# Patient Record
Sex: FEMALE | Race: WHITE | NOT HISPANIC OR LATINO | Employment: OTHER | URBAN - METROPOLITAN AREA
[De-identification: names, ages, dates, MRNs, and addresses within clinical notes are randomized per-mention and may not be internally consistent; named-entity substitution may affect disease eponyms.]

---

## 2017-01-04 ENCOUNTER — HOSPITAL ENCOUNTER (OUTPATIENT)
Dept: RADIOLOGY | Facility: MEDICAL CENTER | Age: 67
End: 2017-01-04
Attending: FAMILY MEDICINE
Payer: MEDICARE

## 2017-01-04 DIAGNOSIS — M79.631 PAIN OF RIGHT FOREARM: ICD-10-CM

## 2017-01-04 PROCEDURE — 73218 MRI UPPER EXTREMITY W/O DYE: CPT | Mod: RT

## 2017-02-02 ENCOUNTER — RX ONLY (OUTPATIENT)
Age: 67
Setting detail: RX ONLY
End: 2017-02-02

## 2017-02-07 ENCOUNTER — HOSPITAL ENCOUNTER (OUTPATIENT)
Dept: RADIOLOGY | Facility: MEDICAL CENTER | Age: 67
End: 2017-02-07
Attending: FAMILY MEDICINE
Payer: MEDICARE

## 2017-02-07 DIAGNOSIS — M85.80 OSTEOPENIA: ICD-10-CM

## 2017-02-07 DIAGNOSIS — Z12.31 ENCOUNTER FOR SCREENING MAMMOGRAM FOR BREAST CANCER: ICD-10-CM

## 2017-02-07 DIAGNOSIS — E28.39 DECREASED ESTROGEN LEVEL: ICD-10-CM

## 2017-02-07 PROCEDURE — 77063 BREAST TOMOSYNTHESIS BI: CPT

## 2017-02-07 PROCEDURE — 77080 DXA BONE DENSITY AXIAL: CPT

## 2017-02-24 ENCOUNTER — HOSPITAL ENCOUNTER (OUTPATIENT)
Dept: LAB | Facility: MEDICAL CENTER | Age: 67
End: 2017-02-24
Attending: NURSE PRACTITIONER
Payer: MEDICARE

## 2017-02-24 ENCOUNTER — OFFICE VISIT (OUTPATIENT)
Dept: URGENT CARE | Facility: CLINIC | Age: 67
End: 2017-02-24
Payer: MEDICARE

## 2017-02-24 VITALS
BODY MASS INDEX: 19.53 KG/M2 | TEMPERATURE: 98.5 F | HEART RATE: 98 BPM | WEIGHT: 100 LBS | RESPIRATION RATE: 16 BRPM | DIASTOLIC BLOOD PRESSURE: 76 MMHG | OXYGEN SATURATION: 97 % | SYSTOLIC BLOOD PRESSURE: 110 MMHG

## 2017-02-24 DIAGNOSIS — R10.84 GENERALIZED ABDOMINAL CRAMPING: ICD-10-CM

## 2017-02-24 DIAGNOSIS — K57.52 DIVERTICULITIS OF BOTH SMALL AND LARGE INTESTINE WITHOUT PERFORATION OR ABSCESS WITHOUT BLEEDING: ICD-10-CM

## 2017-02-24 LAB
ALBUMIN SERPL BCP-MCNC: 3.2 G/DL (ref 3.2–4.9)
ALBUMIN/GLOB SERPL: 1 G/DL
ALP SERPL-CCNC: 108 U/L (ref 30–99)
ALT SERPL-CCNC: 42 U/L (ref 2–50)
ANION GAP SERPL CALC-SCNC: 9 MMOL/L (ref 0–11.9)
AST SERPL-CCNC: 51 U/L (ref 12–45)
BASOPHILS # BLD AUTO: 0.06 K/UL (ref 0–0.12)
BASOPHILS NFR BLD AUTO: 0.8 % (ref 0–1.8)
BILIRUB SERPL-MCNC: 0.3 MG/DL (ref 0.1–1.5)
BUN SERPL-MCNC: 12 MG/DL (ref 8–22)
CALCIUM SERPL-MCNC: 6 MG/DL (ref 8.5–10.5)
CHLORIDE SERPL-SCNC: 106 MMOL/L (ref 96–112)
CO2 SERPL-SCNC: 22 MMOL/L (ref 20–33)
CREAT SERPL-MCNC: 0.83 MG/DL (ref 0.5–1.4)
CRP SERPL HS-MCNC: 0.2 MG/DL (ref 0–0.75)
EOSINOPHIL # BLD: 0.06 K/UL (ref 0–0.51)
EOSINOPHIL NFR BLD AUTO: 0.8 % (ref 0–6.9)
ERYTHROCYTE [DISTWIDTH] IN BLOOD BY AUTOMATED COUNT: 45.9 FL (ref 35.9–50)
GLOBULIN SER CALC-MCNC: 3.2 G/DL (ref 1.9–3.5)
GLUCOSE SERPL-MCNC: 90 MG/DL (ref 65–99)
HCT VFR BLD AUTO: 35.2 % (ref 37–47)
HGB BLD-MCNC: 11 G/DL (ref 12–16)
IMM GRANULOCYTES # BLD AUTO: 0.02 K/UL (ref 0–0.11)
IMM GRANULOCYTES NFR BLD AUTO: 0.3 % (ref 0–0.9)
LYMPHOCYTES # BLD: 1.97 K/UL (ref 1–4.8)
LYMPHOCYTES NFR BLD AUTO: 25.5 % (ref 22–41)
MCH RBC QN AUTO: 30.5 PG (ref 27–33)
MCHC RBC AUTO-ENTMCNC: 31.3 G/DL (ref 33.6–35)
MCV RBC AUTO: 97.5 FL (ref 81.4–97.8)
MONOCYTES # BLD: 0.81 K/UL (ref 0–0.85)
MONOCYTES NFR BLD AUTO: 10.5 % (ref 0–13.4)
NEUTROPHILS # BLD: 4.81 K/UL (ref 2–7.15)
NEUTROPHILS NFR BLD AUTO: 62.1 % (ref 44–72)
NRBC # BLD AUTO: 0 K/UL
NRBC BLD-RTO: 0 /100 WBC
PLATELET # BLD AUTO: 372 K/UL (ref 164–446)
PMV BLD AUTO: 10.9 FL (ref 9–12.9)
POTASSIUM SERPL-SCNC: 4.4 MMOL/L (ref 3.6–5.5)
PROT SERPL-MCNC: 6.4 G/DL (ref 6–8.2)
RBC # BLD AUTO: 3.61 M/UL (ref 4.2–5.4)
SODIUM SERPL-SCNC: 137 MMOL/L (ref 135–145)
WBC # BLD AUTO: 7.7 K/UL (ref 4.8–10.8)

## 2017-02-24 PROCEDURE — 1101F PT FALLS ASSESS-DOCD LE1/YR: CPT | Performed by: NURSE PRACTITIONER

## 2017-02-24 PROCEDURE — 3014F SCREEN MAMMO DOC REV: CPT | Performed by: NURSE PRACTITIONER

## 2017-02-24 PROCEDURE — G8432 DEP SCR NOT DOC, RNG: HCPCS | Performed by: NURSE PRACTITIONER

## 2017-02-24 PROCEDURE — 86140 C-REACTIVE PROTEIN: CPT

## 2017-02-24 PROCEDURE — 85025 COMPLETE CBC W/AUTO DIFF WBC: CPT

## 2017-02-24 PROCEDURE — 36415 COLL VENOUS BLD VENIPUNCTURE: CPT

## 2017-02-24 PROCEDURE — G8482 FLU IMMUNIZE ORDER/ADMIN: HCPCS | Performed by: NURSE PRACTITIONER

## 2017-02-24 PROCEDURE — 80053 COMPREHEN METABOLIC PANEL: CPT

## 2017-02-24 PROCEDURE — 99204 OFFICE O/P NEW MOD 45 MIN: CPT | Performed by: NURSE PRACTITIONER

## 2017-02-24 PROCEDURE — G8419 CALC BMI OUT NRM PARAM NOF/U: HCPCS | Performed by: NURSE PRACTITIONER

## 2017-02-24 PROCEDURE — 3017F COLORECTAL CA SCREEN DOC REV: CPT | Mod: 8P | Performed by: NURSE PRACTITIONER

## 2017-02-24 PROCEDURE — 1036F TOBACCO NON-USER: CPT | Performed by: NURSE PRACTITIONER

## 2017-02-24 PROCEDURE — 4040F PNEUMOC VAC/ADMIN/RCVD: CPT | Mod: 8P | Performed by: NURSE PRACTITIONER

## 2017-02-24 RX ORDER — METRONIDAZOLE 500 MG/1
500 TABLET ORAL 3 TIMES DAILY
Qty: 21 TAB | Refills: 0 | Status: SHIPPED | OUTPATIENT
Start: 2017-02-24 | End: 2017-03-03

## 2017-02-24 RX ORDER — CIPROFLOXACIN 500 MG/1
500 TABLET, FILM COATED ORAL 2 TIMES DAILY
Qty: 14 TAB | Refills: 0 | Status: SHIPPED | OUTPATIENT
Start: 2017-02-24 | End: 2017-03-03

## 2017-02-24 RX ORDER — DICYCLOMINE HCL 20 MG
20 TABLET ORAL EVERY 6 HOURS
Qty: 90 TAB | Refills: 0 | Status: ON HOLD | OUTPATIENT
Start: 2017-02-24 | End: 2017-07-19

## 2017-02-24 ASSESSMENT — ENCOUNTER SYMPTOMS
NAUSEA: 0
FLATUS: 1
CHILLS: 0
ABDOMINAL PAIN: 1
SWEATS: 0
FEVER: 0
BLOOD IN STOOL: 0
DIARRHEA: 1
BLOATING: 1
VOMITING: 0

## 2017-02-24 NOTE — MR AVS SNAPSHOT
Mamie Michaels   2017 1:15 PM   Office Visit   MRN: [id number]    Department:  [facility]   Dept Phone:  [phone]    Description:  Female : 1950   Provider:  LEXII Israel           Reason for Visit     Diarrhea x 20 years the last couple days its getting worse, crampning      Allergies as of 2017     No Known Allergies      You were diagnosed with     Diverticulitis of both small and large intestine without perforation or abscess without bleeding   [395056]       Generalized abdominal cramping   [216188]         Vital Signs     Blood Pressure Pulse Temperature Respirations Weight Oxygen Saturation    110/76 mmHg 98 36.9 °C (98.5 °F) 16 45.36 kg (100 lb) 97%    Smoking Status                   Never Smoker            Basic Information     Date Of Birth Sex Race Ethnicity Preferred Language    1950 Female White Non- English      Problem List              ICD-10-CM Priority Class Noted - Resolved    Anemia D64.9   2016 - Present    History of lower GI bleeding Z87.19   2016 - Present    Hypothyroidism due to Hashimoto's thyroiditis E03.8, E06.3   2016 - Present    Osteopenia M85.80   2016 - Present    Right wrist pain M25.531   2016 - Present    Rash R21   2016 - Present    History of skin cancer of unknown type Z80.8   2016 - Present    Vitamin D deficiency E55.9   Unknown - Present    Primary insomnia F51.01   2016 - Present    Hypocalcemia E83.51   2016 - Present    Chronic kidney disease, stage III (moderate) N18.3   2016 - Present      Health Maintenance        Date Due Completion Dates    PAP SMEAR 1972 ---    COLONOSCOPY 2001 ---    IMM PNEUMOCOCCAL 65+ (ADULT) LOW/MEDIUM RISK SERIES (1 of 2 - PCV13) 2016 ---    MAMMOGRAM 2018, 2010, 2008, 2008, 3/14/2007, 3/14/2007    BONE DENSITY 2022, 2010, 2008    IMM DTaP/Tdap/Td Vaccine  (2 - Td) 10/19/2026 10/19/2016            Current Immunizations     Influenza Vaccine Quad Inj (Preserved) 10/19/2016    SHINGLES VACCINE 1/22/2013    Tdap Vaccine 10/19/2016      Below and/or attached are the medications your provider expects you to take. Review all of your home medications and newly ordered medications with your provider and/or pharmacist. Follow medication instructions as directed by your provider and/or pharmacist. Please keep your medication list with you and share with your provider. Update the information when medications are discontinued, doses are changed, or new medications (including over-the-counter products) are added; and carry medication information at all times in the event of emergency situations     Allergies:  No Known Allergies          Medications  Valid as of: February 24, 2017 -  2:24 PM    Generic Name Brand Name Tablet Size Instructions for use    Calcium Citrate-Vitamin D   Take  by mouth.        Ciprofloxacin HCl (Tab) CIPRO 500 MG Take 1 Tab by mouth 2 times a day for 7 days.        Diclofenac Sodium (Gel) Diclofenac Sodium 1 % Apply to small amount to affected area BID as needed for pain        Dicyclomine HCl (Tab) BENTYL 20 MG Take 1 Tab by mouth every 6 hours.        Ibandronate Sodium (Tab) BONIVA 150 MG Take 150 mg by mouth every 30 days.        Levothyroxine Sodium (Tab) SYNTHROID 75 MCG Take 1 Tab by mouth Every morning on an empty stomach.        MetroNIDAZOLE (Tab) FLAGYL 500 MG Take 1 Tab by mouth 3 times a day for 7 days.        Multiple Vitamins-Minerals (Tab) THERAGRAN-M  Take 1 Tab by mouth every day.        Omeprazole (CAPSULE DELAYED RELEASE) PRILOSEC 20 MG Take 20 mg by mouth every day.        Psyllium   Take  by mouth.        Zolpidem Tartrate (Tab) AMBIEN 10 MG Take 1 Tab by mouth at bedtime as needed for Sleep.        .                 Medicines prescribed today were sent to:     [facility] - [place], [place] - [address]    [address] [place] [address]    Phone: [phone] Fax: [phone]    Open 24 Hours?: No      Medication refill instructions:       If your prescription bottle indicates you have medication refills left, it is not necessary to call your provider’s office. Please contact your pharmacy and they will refill your medication.    If your prescription bottle indicates you do not have any refills left, you may request refills at any time through one of the following ways: The online Panorama9 system (except Urgent Care), by calling your provider’s office, or by asking your pharmacy to contact your provider’s office with a refill request. Medication refills are processed only during regular business hours and may not be available until the next business day. Your provider may request additional information or to have a follow-up visit with you prior to refilling your medication.   *Please Note: Medication refills are assigned a new Rx number when refilled electronically. Your pharmacy may indicate that no refills were authorized even though a new prescription for the same medication is available at the pharmacy. Please request the medicine by name with the pharmacy before contacting your provider for a refill.        Your To Do List     Future Labs/Procedures Complete By Expires    CBC WITH DIFFERENTIAL  As directed 2/24/2018    COMP METABOLIC PANEL  As directed 2/24/2018    CRP QUANTITIVE (NON-CARDIAC)  As directed 2/24/2018         [email or web address] Access Code: Activation code not generated  Current Panorama9 Status: Active
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
## 2017-02-25 ENCOUNTER — TELEPHONE (OUTPATIENT)
Dept: URGENT CARE | Facility: CLINIC | Age: 67
End: 2017-02-25

## 2017-02-25 ENCOUNTER — HOSPITAL ENCOUNTER (OUTPATIENT)
Facility: MEDICAL CENTER | Age: 67
End: 2017-02-25
Attending: NURSE PRACTITIONER
Payer: MEDICARE

## 2017-02-25 PROCEDURE — 87045 FECES CULTURE AEROBIC BACT: CPT

## 2017-02-25 PROCEDURE — 87046 STOOL CULTR AEROBIC BACT EA: CPT

## 2017-02-25 NOTE — TELEPHONE ENCOUNTER
Called patient and advised her of critically low calcium levels. She understands and states she feels better today but is aware that if she begins to have muscle stiffness or heart palpitations to go immediately to the ER. She will continue with her daily calcium supplements and electrolyte water intake.

## 2017-02-28 LAB
BACTERIA STL CULT: NORMAL
SIGNIFICANT IND 70042: NORMAL
SOURCE SOURCE: NORMAL

## 2017-03-27 ENCOUNTER — OFFICE VISIT (OUTPATIENT)
Dept: MEDICAL GROUP | Facility: MEDICAL CENTER | Age: 67
End: 2017-03-27
Payer: MEDICARE

## 2017-03-27 VITALS
RESPIRATION RATE: 16 BRPM | SYSTOLIC BLOOD PRESSURE: 140 MMHG | OXYGEN SATURATION: 96 % | HEART RATE: 99 BPM | WEIGHT: 101 LBS | HEIGHT: 60 IN | DIASTOLIC BLOOD PRESSURE: 80 MMHG | TEMPERATURE: 97.8 F | BODY MASS INDEX: 19.83 KG/M2

## 2017-03-27 DIAGNOSIS — K64.9 HEMORRHOIDS, UNSPECIFIED HEMORRHOID TYPE: ICD-10-CM

## 2017-03-27 DIAGNOSIS — F51.01 PRIMARY INSOMNIA: ICD-10-CM

## 2017-03-27 PROCEDURE — 3014F SCREEN MAMMO DOC REV: CPT | Performed by: NURSE PRACTITIONER

## 2017-03-27 PROCEDURE — 1101F PT FALLS ASSESS-DOCD LE1/YR: CPT | Performed by: NURSE PRACTITIONER

## 2017-03-27 PROCEDURE — 3017F COLORECTAL CA SCREEN DOC REV: CPT | Mod: 8P | Performed by: NURSE PRACTITIONER

## 2017-03-27 PROCEDURE — 4040F PNEUMOC VAC/ADMIN/RCVD: CPT | Mod: 8P | Performed by: NURSE PRACTITIONER

## 2017-03-27 PROCEDURE — 99214 OFFICE O/P EST MOD 30 MIN: CPT | Performed by: NURSE PRACTITIONER

## 2017-03-27 PROCEDURE — G8420 CALC BMI NORM PARAMETERS: HCPCS | Performed by: NURSE PRACTITIONER

## 2017-03-27 PROCEDURE — 1036F TOBACCO NON-USER: CPT | Performed by: NURSE PRACTITIONER

## 2017-03-27 PROCEDURE — G8432 DEP SCR NOT DOC, RNG: HCPCS | Performed by: NURSE PRACTITIONER

## 2017-03-27 PROCEDURE — G8482 FLU IMMUNIZE ORDER/ADMIN: HCPCS | Performed by: NURSE PRACTITIONER

## 2017-03-27 RX ORDER — AMITRIPTYLINE HYDROCHLORIDE 25 MG/1
25-50 TABLET, FILM COATED ORAL NIGHTLY PRN
Qty: 60 TAB | Refills: 0 | Status: ON HOLD | OUTPATIENT
Start: 2017-03-27 | End: 2017-07-19

## 2017-03-27 RX ORDER — ZOLPIDEM TARTRATE 10 MG/1
10 TABLET ORAL NIGHTLY PRN
Qty: 30 TAB | Refills: 2 | Status: SHIPPED | OUTPATIENT
Start: 2017-03-27 | End: 2017-07-12 | Stop reason: SDUPTHER

## 2017-03-27 NOTE — MR AVS SNAPSHOT
Mamie Michaels   3/27/2017 9:20 AM   Office Visit   MRN: [id number]    Department:  [facility]   Dept Phone:  [phone]    Description:  Female : 1950   Provider:  LEXII Corrales           Reason for Visit     Medication Refill           Allergies as of 3/27/2017     No Known Allergies      You were diagnosed with     Primary insomnia   [192410]       Hemorrhoids, unspecified hemorrhoid type   [[id number]]         Vital Signs     Blood Pressure Pulse Temperature Respirations Height Weight    140/80 mmHg 99 36.6 °C (97.8 °F) 16 1.524 m (5') 45.813 kg (101 lb)    Body Mass Index Oxygen Saturation Smoking Status             19.73 kg/m2 96% Never Smoker          Basic Information     Date Of Birth Sex Race Ethnicity Preferred Language    1950 Female White Non- English      Problem List              ICD-10-CM Priority Class Noted - Resolved    Anemia D64.9   2016 - Present    History of lower GI bleeding Z87.19   2016 - Present    Hypothyroidism due to Hashimoto's thyroiditis E03.8, E06.3   2016 - Present    Osteopenia M85.80   2016 - Present    Right wrist pain M25.531   2016 - Present    Rash R21   2016 - Present    History of skin cancer of unknown type Z80.8   2016 - Present    Vitamin D deficiency E55.9   Unknown - Present    Primary insomnia F51.01   2016 - Present    Hypocalcemia E83.51   2016 - Present    Chronic kidney disease, stage III (moderate) N18.3   2016 - Present      Health Maintenance        Date Due Completion Dates    PAP SMEAR 1972 ---    COLONOSCOPY 2001 ---    IMM PNEUMOCOCCAL 65+ (ADULT) LOW/MEDIUM RISK SERIES (1 of 2 - PCV13) 2016 ---    MAMMOGRAM 2018, 2010, 2008, 2008, 3/14/2007, 3/14/2007    BONE DENSITY 2022, 2010, 2008    IMM DTaP/Tdap/Td Vaccine (2 - Td) 10/19/2026 10/19/2016            Current Immunizations     Influenza  Vaccine Quad Inj (Preserved) 10/19/2016    SHINGLES VACCINE 1/22/2013    Tdap Vaccine 10/19/2016      Below and/or attached are the medications your provider expects you to take. Review all of your home medications and newly ordered medications with your provider and/or pharmacist. Follow medication instructions as directed by your provider and/or pharmacist. Please keep your medication list with you and share with your provider. Update the information when medications are discontinued, doses are changed, or new medications (including over-the-counter products) are added; and carry medication information at all times in the event of emergency situations     Allergies:  No Known Allergies          Medications  Valid as of: March 27, 2017 - 10:40 AM    Generic Name Brand Name Tablet Size Instructions for use    Amitriptyline HCl (Tab) ELAVIL 25 MG Take 1-2 Tabs by mouth at bedtime as needed.        Calcium Citrate-Vitamin D   Take  by mouth.        Diclofenac Sodium (Gel) Diclofenac Sodium 1 % Apply to small amount to affected area BID as needed for pain        Dicyclomine HCl (Tab) BENTYL 20 MG Take 1 Tab by mouth every 6 hours.        Ibandronate Sodium (Tab) BONIVA 150 MG Take 150 mg by mouth every 30 days.        Levothyroxine Sodium (Tab) SYNTHROID 75 MCG Take 1 Tab by mouth Every morning on an empty stomach.        Multiple Vitamins-Minerals (Tab) THERAGRAN-M  Take 1 Tab by mouth every day.        Omeprazole (CAPSULE DELAYED RELEASE) PRILOSEC 20 MG Take 20 mg by mouth every day.        Pramoxine HCl (Foam) PROCTOFOAM 1 % Apply MD QID PRN        Psyllium   Take  by mouth.        Zolpidem Tartrate (Tab) AMBIEN 10 MG Take 1 Tab by mouth at bedtime as needed for Sleep.        .                 Medicines prescribed today were sent to:     [facility] [place] [place] - [address]    [address] [facility] [place] [address]    Phone: [phone] Fax: [phone]    Open 24 Hours?: No      Medication  refill instructions:       If your prescription bottle indicates you have medication refills left, it is not necessary to call your provider’s office. Please contact your pharmacy and they will refill your medication.    If your prescription bottle indicates you do not have any refills left, you may request refills at any time through one of the following ways: The online Adictiz system (except Urgent Care), by calling your provider’s office, or by asking your pharmacy to contact your provider’s office with a refill request. Medication refills are processed only during regular business hours and may not be available until the next business day. Your provider may request additional information or to have a follow-up visit with you prior to refilling your medication.   *Please Note: Medication refills are assigned a new Rx number when refilled electronically. Your pharmacy may indicate that no refills were authorized even though a new prescription for the same medication is available at the pharmacy. Please request the medicine by name with the pharmacy before contacting your provider for a refill.           Adictiz Access Code: Activation code not generated  Current Adictiz Status: Active
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
## 2017-03-27 NOTE — ASSESSMENT & PLAN NOTE
Continues with ambien 10 mg daily, not always effective  Caffeine 1 up in the morning  ETOH: very little  No tobacco  Watching TV in bed  No regular exercise although she plays golf on occ.  Does not recall trying any other medication but is agreeable to trying alternative  Denies falls, wandering or eating at night

## 2017-06-26 ENCOUNTER — RX ONLY (OUTPATIENT)
Age: 67
Setting detail: RX ONLY
End: 2017-06-26

## 2017-07-05 ENCOUNTER — APPOINTMENT (OUTPATIENT)
Dept: OTHER | Facility: IMAGING CENTER | Age: 67
End: 2017-07-05

## 2017-07-05 PROCEDURE — 97530 THERAPEUTIC ACTIVITIES: CPT | Performed by: FAMILY MEDICINE

## 2017-07-12 ENCOUNTER — HOSPITAL ENCOUNTER (OUTPATIENT)
Dept: LAB | Facility: MEDICAL CENTER | Age: 67
End: 2017-07-12
Attending: FAMILY MEDICINE
Payer: MEDICARE

## 2017-07-12 ENCOUNTER — TELEPHONE (OUTPATIENT)
Dept: MEDICAL GROUP | Age: 67
End: 2017-07-12

## 2017-07-12 ENCOUNTER — OFFICE VISIT (OUTPATIENT)
Dept: MEDICAL GROUP | Facility: MEDICAL CENTER | Age: 67
End: 2017-07-12
Payer: MEDICARE

## 2017-07-12 VITALS
DIASTOLIC BLOOD PRESSURE: 78 MMHG | WEIGHT: 104 LBS | HEART RATE: 90 BPM | TEMPERATURE: 98 F | OXYGEN SATURATION: 97 % | SYSTOLIC BLOOD PRESSURE: 122 MMHG | HEIGHT: 60 IN | BODY MASS INDEX: 20.42 KG/M2

## 2017-07-12 DIAGNOSIS — N18.30 CHRONIC KIDNEY DISEASE, STAGE III (MODERATE) (HCC): ICD-10-CM

## 2017-07-12 DIAGNOSIS — E83.51 HYPOCALCEMIA: ICD-10-CM

## 2017-07-12 DIAGNOSIS — F51.01 PRIMARY INSOMNIA: ICD-10-CM

## 2017-07-12 DIAGNOSIS — H90.0 CONDUCTIVE HEARING LOSS, BILATERAL: ICD-10-CM

## 2017-07-12 DIAGNOSIS — L03.115 CELLULITIS OF RIGHT LOWER EXTREMITY: ICD-10-CM

## 2017-07-12 LAB
ANION GAP SERPL CALC-SCNC: 7 MMOL/L (ref 0–11.9)
BUN SERPL-MCNC: 13 MG/DL (ref 8–22)
CALCIUM SERPL-MCNC: 6.4 MG/DL (ref 8.5–10.5)
CHLORIDE SERPL-SCNC: 110 MMOL/L (ref 96–112)
CO2 SERPL-SCNC: 23 MMOL/L (ref 20–33)
CREAT SERPL-MCNC: 0.72 MG/DL (ref 0.5–1.4)
GFR SERPL CREATININE-BSD FRML MDRD: >60 ML/MIN/1.73 M 2
GLUCOSE SERPL-MCNC: 82 MG/DL (ref 65–99)
PHOSPHATE SERPL-MCNC: 3.5 MG/DL (ref 2.5–4.5)
POTASSIUM SERPL-SCNC: 4.3 MMOL/L (ref 3.6–5.5)
PTH-INTACT SERPL-MCNC: 21.1 PG/ML (ref 14–72)
SODIUM SERPL-SCNC: 140 MMOL/L (ref 135–145)

## 2017-07-12 PROCEDURE — 99214 OFFICE O/P EST MOD 30 MIN: CPT | Performed by: FAMILY MEDICINE

## 2017-07-12 PROCEDURE — 83970 ASSAY OF PARATHORMONE: CPT

## 2017-07-12 PROCEDURE — 84100 ASSAY OF PHOSPHORUS: CPT

## 2017-07-12 PROCEDURE — 36415 COLL VENOUS BLD VENIPUNCTURE: CPT

## 2017-07-12 PROCEDURE — 80048 BASIC METABOLIC PNL TOTAL CA: CPT

## 2017-07-12 RX ORDER — COLESTIPOL HYDROCHLORIDE 5 G/5G
GRANULE, FOR SUSPENSION ORAL
Status: ON HOLD | COMMUNITY
Start: 2017-05-16 | End: 2017-07-19

## 2017-07-12 RX ORDER — IMIQUIMOD 12.5 MG/.25G
CREAM TOPICAL
Status: ON HOLD | COMMUNITY
Start: 2017-06-27 | End: 2017-07-19

## 2017-07-12 RX ORDER — ZOLPIDEM TARTRATE 10 MG/1
10 TABLET ORAL NIGHTLY PRN
Qty: 30 TAB | Refills: 2 | Status: SHIPPED | OUTPATIENT
Start: 2017-07-12

## 2017-07-12 NOTE — ASSESSMENT & PLAN NOTE
Patient reports that she scraped the back of her leg about 2 weeks ago and since then she's had increased redness and tenderness as well as some swelling. She denies any drainage.

## 2017-07-12 NOTE — MR AVS SNAPSHOT
Mamie Michaels   2017 7:20 AM   Office Visit   MRN: [id number]    Department:  [facility]   Dept Phone:  [phone]    Description:  Female : 1950   Provider:  Sol Danielle M.D.           Reason for Visit     Ear Fullness bilateral    Leg Swelling right leg    Medication Refill Ambien      Allergies as of 2017     No Known Allergies      You were diagnosed with     Conductive hearing loss, bilateral   [389.06.ICD-9-CM]       Primary insomnia   [994146]       Cellulitis of right lower extremity   [831968]       Hypocalcemia   [275.41.ICD-9-CM]       Chronic kidney disease, stage III (moderate)   [585.3.ICD-9-CM]         Vital Signs     Blood Pressure Pulse Temperature Height Weight Body Mass Index    122/78 mmHg 90 36.7 °C (98 °F) 1.524 m (5') 47.174 kg (104 lb) 20.31 kg/m2    Oxygen Saturation Smoking Status                97% Never Smoker           Basic Information     Date Of Birth Sex Race Ethnicity Preferred Language    1950 Female White Non- English      Your appointments     Aug 14, 2017  1:30 PM   ACU GROUP with Dimas Horton M.D.   [facility] (--)    [address] [facility] [id number] [phone]              Problem List              ICD-10-CM Priority Class Noted - Resolved    Anemia D64.9   2016 - Present    History of lower GI bleeding Z87.19   2016 - Present    Hypothyroidism due to Hashimoto's thyroiditis E03.8, E06.3   2016 - Present    Osteopenia M85.80   2016 - Present    Right wrist pain M25.531   2016 - Present    Rash R21   2016 - Present    History of skin cancer of unknown type Z80.8   2016 - Present    Vitamin D deficiency E55.9   Unknown - Present    Primary insomnia F51.01   2016 - Present    Hypocalcemia E83.51   2016 - Present    Chronic kidney disease, stage III (moderate) N18.3   2016 - Present    Conductive hearing loss,  bilateral H90.0   7/12/2017 - Present      Health Maintenance        Date Due Completion Dates    PAP SMEAR 12/25/1972 ---    COLONOSCOPY 12/25/2001 ---    IMM PNEUMOCOCCAL 65+ (ADULT) LOW/MEDIUM RISK SERIES (1 of 2 - PCV13) 12/25/2016 ---    IMM INFLUENZA (1) 9/1/2017 10/19/2016    MAMMOGRAM 2/7/2018 2/7/2017, 12/7/2010, 6/9/2008, 6/9/2008, 3/14/2007, 3/14/2007    BONE DENSITY 2/7/2022 2/7/2017, 12/7/2010, 6/9/2008    IMM DTaP/Tdap/Td Vaccine (2 - Td) 10/19/2026 10/19/2016            Current Immunizations     Influenza Vaccine Quad Inj (Preserved) 10/19/2016    SHINGLES VACCINE 1/22/2013    Tdap Vaccine 10/19/2016      Below and/or attached are the medications your provider expects you to take. Review all of your home medications and newly ordered medications with your provider and/or pharmacist. Follow medication instructions as directed by your provider and/or pharmacist. Please keep your medication list with you and share with your provider. Update the information when medications are discontinued, doses are changed, or new medications (including over-the-counter products) are added; and carry medication information at all times in the event of emergency situations     Allergies:  No Known Allergies          Medications  Valid as of: July 12, 2017 -  7:49 AM    Generic Name Brand Name Tablet Size Instructions for use    Amitriptyline HCl (Tab) ELAVIL 25 MG Take 1-2 Tabs by mouth at bedtime as needed.        Calcium Citrate-Vitamin D   Take  by mouth.        Colestipol HCl (Pack) COLESTID 5 GM         Diclofenac Sodium (Gel) Diclofenac Sodium 1 % Apply to small amount to affected area BID as needed for pain        Dicyclomine HCl (Tab) BENTYL 20 MG Take 1 Tab by mouth every 6 hours.        Ibandronate Sodium (Tab) BONIVA 150 MG Take 150 mg by mouth every 30 days.        Imiquimod (Cream) ALDARA 5 %         Levothyroxine Sodium (Tab) SYNTHROID 75 MCG Take 1 Tab by mouth Every morning on an empty stomach.         Multiple Vitamins-Minerals (Tab) THERAGRAN-M  Take 1 Tab by mouth every day.        Mupirocin (Ointment) BACTROBAN 2 % Apply thin film to affected area q 8 hours for 5 days        Omeprazole (CAPSULE DELAYED RELEASE) PRILOSEC 20 MG Take 20 mg by mouth every day.        Pramoxine HCl (Foam) PROCTOFOAM 1 % Apply FL QID PRN        Psyllium   Take  by mouth.        Zolpidem Tartrate (Tab) AMBIEN 10 MG Take 1 Tab by mouth at bedtime as needed for Sleep.        .                 Medicines prescribed today were sent to:     [facility] [address] [place] [address], [place] - [address]    [address] [place] [address]    Phone: [phone] Fax: [phone]    Open 24 Hours?: No      Medication refill instructions:       If your prescription bottle indicates you have medication refills left, it is not necessary to call your provider’s office. Please contact your pharmacy and they will refill your medication.    If your prescription bottle indicates you do not have any refills left, you may request refills at any time through one of the following ways: The online [employer] system (except Urgent Care), by calling your provider’s office, or by asking your pharmacy to contact your provider’s office with a refill request. Medication refills are processed only during regular business hours and may not be available until the next business day. Your provider may request additional information or to have a follow-up visit with you prior to refilling your medication.   *Please Note: Medication refills are assigned a new Rx number when refilled electronically. Your pharmacy may indicate that no refills were authorized even though a new prescription for the same medication is available at the pharmacy. Please request the medicine by name with the pharmacy before contacting your provider for a refill.        Your To Do List     Future Labs/Procedures Complete By Expires    BASIC METABOLIC PANEL  As directed 7/13/2018    CALCIUM (CA)   As directed 7/12/2018    PHOSPHORUS  As directed 7/12/2018    PTH INTACT (PTH ONLY)  As directed 7/12/2018      Referral     A referral request has been sent to our patient care coordination department. Please allow 3-5 business days for us to process this request and contact you either by phone or mail. If you do not hear from us by the 5th business day, please call us at [phone].           [employer] Access Code: Activation code not generated  Current [employer] Status: Active

## 2017-07-12 NOTE — ASSESSMENT & PLAN NOTE
The patient complains of increasing decreased hearing. She reports that she is often missing parts of conversations. She would like to have her ears checked to make sure she does not have wax in there and would like a referral to audiology. She denies any pain.

## 2017-07-12 NOTE — ASSESSMENT & PLAN NOTE
Patient had a very low calcium level after a bout of diarrhea. She was seen in the emergency room at which time her calcium was 6.0. She does have a history of chronic renal disease stage III that has been stable. She denies any palpitations or severe muscle cramps. She has not had her labs rechecked since that time.

## 2017-07-12 NOTE — ASSESSMENT & PLAN NOTE
This is chronic. Patient has difficulty with sleeping. Primarily secondary to mind racing. Difficulty falling asleep and staying asleep. Patient uses electronics before bed. Drinks beverages with caffeine into the afternoon. Not exercising first thing in the morning.  Currently taking Ambien 10 mg and takes it every night or she cannot sleep. She denies any amnestic events or falls.
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
## 2017-07-13 NOTE — TELEPHONE ENCOUNTER
On call note:  Received call from lab regarding critical lab result of low calcium 6.4 mg/dl. No albumin available.  Reviewed pt chart which shows prior labs with slightly lower calcium of 6.0 mg/dl in 2/2017 and 7.9 mg/dl in 12/2017.   Thus, appears hypocalcemia may be a chronic issue.  Tried to call pt to notify of result and inquire if having any significant symptoms which may be related at this time.   No answer on both numbers, message left to have me paged to discuss her results.    CC: Sol Danielle M.D.

## 2017-07-14 DIAGNOSIS — E83.51 HYPOCALCEMIA: ICD-10-CM

## 2017-07-18 ENCOUNTER — HOSPITAL ENCOUNTER (INPATIENT)
Facility: MEDICAL CENTER | Age: 67
LOS: 1 days | DRG: 641 | End: 2017-07-19
Attending: EMERGENCY MEDICINE | Admitting: INTERNAL MEDICINE
Payer: MEDICARE

## 2017-07-18 ENCOUNTER — RESOLUTE PROFESSIONAL BILLING HOSPITAL PROF FEE (OUTPATIENT)
Dept: HOSPITALIST | Facility: MEDICAL CENTER | Age: 67
End: 2017-07-18
Payer: MEDICARE

## 2017-07-18 ENCOUNTER — HOSPITAL ENCOUNTER (OUTPATIENT)
Dept: LAB | Facility: MEDICAL CENTER | Age: 67
End: 2017-07-18
Attending: FAMILY MEDICINE
Payer: MEDICARE

## 2017-07-18 ENCOUNTER — TELEPHONE (OUTPATIENT)
Dept: MEDICAL GROUP | Age: 67
End: 2017-07-18

## 2017-07-18 DIAGNOSIS — E83.42 HYPOMAGNESEMIA: ICD-10-CM

## 2017-07-18 DIAGNOSIS — E83.51 HYPOCALCEMIA: ICD-10-CM

## 2017-07-18 LAB
25(OH)D3 SERPL-MCNC: 34 NG/ML (ref 30–100)
HIV 1+2 AB+HIV1 P24 AG SERPL QL IA: NON REACTIVE
MAGNESIUM SERPL-MCNC: <0.5 MG/DL (ref 1.5–2.5)

## 2017-07-18 PROCEDURE — 87389 HIV-1 AG W/HIV-1&-2 AB AG IA: CPT

## 2017-07-18 PROCEDURE — 86038 ANTINUCLEAR ANTIBODIES: CPT

## 2017-07-18 PROCEDURE — 86225 DNA ANTIBODY NATIVE: CPT

## 2017-07-18 PROCEDURE — 82306 VITAMIN D 25 HYDROXY: CPT

## 2017-07-18 PROCEDURE — 83735 ASSAY OF MAGNESIUM: CPT | Mod: 91

## 2017-07-18 PROCEDURE — 99285 EMERGENCY DEPT VISIT HI MDM: CPT

## 2017-07-18 PROCEDURE — 86235 NUCLEAR ANTIGEN ANTIBODY: CPT | Mod: 91

## 2017-07-18 PROCEDURE — 80053 COMPREHEN METABOLIC PANEL: CPT

## 2017-07-18 PROCEDURE — 36415 COLL VENOUS BLD VENIPUNCTURE: CPT

## 2017-07-18 PROCEDURE — 83735 ASSAY OF MAGNESIUM: CPT

## 2017-07-18 PROCEDURE — 700111 HCHG RX REV CODE 636 W/ 250 OVERRIDE (IP)

## 2017-07-18 RX ORDER — MAGNESIUM SULFATE HEPTAHYDRATE 40 MG/ML
2 INJECTION, SOLUTION INTRAVENOUS ONCE
Status: COMPLETED | OUTPATIENT
Start: 2017-07-19 | End: 2017-07-19

## 2017-07-18 RX ADMIN — MAGNESIUM SULFATE IN DEXTROSE 1 G: 10 INJECTION, SOLUTION INTRAVENOUS at 23:57

## 2017-07-18 NOTE — IP AVS SNAPSHOT
7/19/2017    Mamie Michaels  [address]  [facility] [address]    Dear Mamie:    [facility] wants to ensure your discharge home is safe and you or your loved ones have had all of your questions answered regarding your care after you leave the hospital.    Below is a list of resources and contact information should you have any questions regarding your hospital stay, follow-up instructions, or active medical symptoms.    Questions or Concerns Regarding… Contact   Medical Questions Related to Your Discharge  (7 days a week, 8am-5pm) Contact a Nurse Care Coordinator   [phone]   Medical Questions Not Related to Your Discharge  (24 hours a day / 7 days a week)  Contact the Nurse Health Line   [phone]    Medications or Discharge Instructions Refer to your discharge packet   or contact your [facility] Primary Care Provider   [phone]   Follow-up Appointment(s) Schedule your appointment via [employer]   or contact Scheduling [phone]   Billing Review your statement via [employer]  or contact Billing [phone]   Medical Records Review your records via [employer]   or contact Medical Records [phone]     You may receive a telephone call within two days of discharge. This call is to make certain you understand your discharge instructions and have the opportunity to have any questions answered. You can also easily access your medical information, test results and upcoming appointments via the [employer] free online health management tool. You can learn more and sign up at [employer]/VIAP. For assistance setting up your VIAP account, please call [phone].    Once again, we want to ensure your discharge home is safe and that you have a clear understanding of any next steps in your care. If you have any questions or concerns, please do not hesitate to contact us, we are here for you. Thank you for choosing [facility] for your healthcare needs.    Sincerely,    Your [facility] Healthcare Team

## 2017-07-18 NOTE — IP AVS SNAPSHOT
[employer] Access Code: Activation code not generated  Current [employer] Status: Active    [employer]  A secure, online tool to manage your health information     [employer]’s QPSoftware® is a secure, online tool that connects you to your personalized health information from the privacy of your home -- day or night - making it very easy for you to manage your healthcare. Once the activation process is completed, you can even access your medical information using the QPSoftware hill, which is available for free in the [employer] store or Google Play store.     QPSoftware provides the following levels of access (as shown below):   My Chart Features   [facility] Primary Care Doctor [facility]  Specialists [facility]  Urgent  Care Non-[facility]  Primary Care  Doctor   Email your healthcare team securely and privately 24/7 X X X X   Manage appointments: schedule your next appointment; view details of past/upcoming appointments X      Request prescription refills. X      View recent personal medical records, including lab and immunizations X X X X   View health record, including health history, allergies, medications X X X X   Read reports about your outpatient visits, procedures, consult and ER notes X X X X   See your discharge summary, which is a recap of your hospital and/or ER visit that includes your diagnosis, lab results, and care plan. X X       How to register for QPSoftware:  1. Go to  [email or web address].  2. Click on the Sign Up Now box, which takes you to the New Member Sign Up page. You will need to provide the following information:  a. Enter your [employer]PSoftware Access Code exactly as it appears at the top of this page. (You will not need to use this code after you’ve completed the sign-up process. If you do not sign up before the expiration date, you must request a new code.)   b. Enter your date of birth.   c. Enter your home email address.   d. Click Submit, and follow the next screen’s instructions.  3. Create a QPSoftware ID. This will  be your [employer] login ID and cannot be changed, so think of one that is secure and easy to remember.  4. Create a [employer] password. You can change your password at any time.  5. Enter your Password Reset Question and Answer. This can be used at a later time if you forget your password.   6. Enter your e-mail address. This allows you to receive e-mail notifications when new information is available in [employer].  7. Click Sign Up. You can now view your health information.    For assistance activating your [employer] account, call [phone]

## 2017-07-18 NOTE — IP AVS SNAPSHOT
" Home Care Instructions                                                                                                                  Name:Mamie Michaels  Medical Record Number:[id number]  CSN: [id number]    YOB: 1950   Age: 66 y.o.  Sex: female  HT:1.549 m (5' 1\") WT: 47.5 kg (104 lb 11.5 oz)          Admit Date: 7/18/2017     Discharge Date:   Today's Date: 7/19/2017  Attending Doctor:  Preethi Hawk M.D.                  Allergies:  Review of patient's allergies indicates no known allergies.            Discharge Instructions       Discharge Instructions    Discharged to home by car with relative. Discharged via wheelchair, hospital escort: Yes.  Special equipment needed: Not Applicable    Be sure to schedule a follow-up appointment with your primary care doctor or any specialists as instructed.     Discharge Plan:   Diet Plan: Discussed  Activity Level: Discussed  Confirmed Follow up Appointment: Appointment Scheduled  Confirmed Symptoms Management: Discussed  Medication Reconciliation Updated: Yes  Influenza Vaccine Indication: Not indicated: Previously immunized this influenza season and > 8 years of age    I understand that a diet low in cholesterol, fat, and sodium is recommended for good health. Unless I have been given specific instructions below for another diet, I accept this instruction as my diet prescription.   Other diet: None    Special Instructions: None    · Is patient discharged on Warfarin / Coumadin?   No     · Is patient Post Blood Transfusion?  No    Depression / Suicide Risk    As you are discharged from this [facility] facility, it is important to learn how to keep safe from harming yourself.    Recognize the warning signs:  · Abrupt changes in personality, positive or negative- including increase in energy   · Giving away possessions  · Change in eating patterns- significant weight changes-  positive or negative  · Change in sleeping patterns- unable to sleep or sleeping all " the time   · Unwillingness or inability to communicate  · Depression  · Unusual sadness, discouragement and loneliness  · Talk of wanting to die  · Neglect of personal appearance   · Rebelliousness- reckless behavior  · Withdrawal from people/activities they love  · Confusion- inability to concentrate     If you or a loved one observes any of these behaviors or has concerns about self-harm, here's what you can do:  · Talk about it- your feelings and reasons for harming yourself  · Remove any means that you might use to hurt yourself (examples: pills, rope, extension cords, firearm)  · Get professional help from the community (Mental Health, Substance Abuse, psychological counseling)  · Do not be alone:Call your Safe Contact- someone whom you trust who will be there for you.  · Call your local CRISIS HOTLINE 538-7099 or 541-776-0689  · Call your local Children's Mobile Crisis Response Team [place] (303) 315-3535 or www.[employer]  · Call the toll free National Suicide Prevention Hotlines   · National Suicide Prevention Lifeline 625-068-XTLT (6530)  · [employer] Line Network 800-SUICIDE (156-4122)    Hypocalcemia, Adult  Hypocalcemia is low blood calcium. Calcium is important for cells to function in the body. Low blood calcium can cause a variety of symptoms and problems.  CAUSES   · Low levels of a body protein called albumin.  · Problems with the parathyroid glands or surgical removal of the parathyroid glands. The parathyroid glands maintain the body's level of calcium.  · Decreased production or improper use of parathyroid hormone.  · Lack (deficiency) of vitamin D or magnesium or both.  · Intestinal problems that interfere with nutrient absorption.  · Alcoholism.  · Kidney problems.  · Inflammation of the pancreas (pancreatitis).  · Certain medicines.  · Severe infections (sepsis).  · Infiltrative diseases. With these diseases the parathyroid glands are filled with cells or substances that are not  normally present. Examples include:  · Sarcoidosis.  · Hemachromatosis.  · Breakdown of large amounts of muscle fiber.  · High levels of phosphate in the body.  · Cancer.  · Massive blood transfusions which usually occur with severe trauma.  SYMPTOMS   · Numbness and tingling in the fingers, toes, or around the mouth.  · Muscle aches or cramps, especially in the legs, feet, and back.  · Muscle twitches.  · Shortness of breath or wheezing.  · Difficulty swallowing.  · Changes in the sound of the voice.  · General weakness.  · Fainting.  · Fast heart beats (palpitations).  · Chest pain.  · Irritability.  · Difficulty thinking.  · Memory problems or confusion.  · Severe fatigue.  · Changes in personality.  · Depression and anxiety.  · Shaking uncontrollably (seizures).  · Coarse, brittle hair and nails.  · Dry skin or lasting (chronic) skin diseases (psoriasis, eczema, or dermatitis).  · Clouding of the eye lens (cataracts).  · Abdominal cramping or pain.  DIAGNOSIS   Hypocalcemia is usually diagnosed through blood tests that reveal a low level of blood calcium. Other tests, such as a recording of the electrical activity of the heart (electrocardiogram, EKG), may be performed in order to diagnose the underlying cause of the condition.  TREATMENT   Treatment for hypocalcemia includes giving calcium supplements. These can be given by mouth or by intravenous (IV) access tube, depending on the severity of the symptoms and deficiency. Other minerals (electrolytes), such as magnesium, may also be given.  HOME CARE INSTRUCTIONS   · Meet with a dietitian to make sure you are eating the most healthful diet possible, or follow diet instructions as directed by your caregiver.  · Follow up with your caregiver as directed.  SEEK IMMEDIATE MEDICAL CARE IF:   · You develop chest pain.  · You develop persistent rapid or irregular heartbeats.  · You have difficulty breathing.  · You faint.  · You develop increased fatigue.  · You have  new swelling in the feet, ankles, or legs.  · You develop increased muscle twitching.  · You start to have seizures.  · You develop confusion.  · You develop mood, memory, or personality changes.  MAKE SURE YOU:   · Understand these instructions.  · Will watch your condition.  · Will get help right away if you are not doing well or get worse.     This information is not intended to replace advice given to you by your health care provider. Make sure you discuss any questions you have with your health care provider.     Document Released: 06/07/2011 Document Revised: 03/11/2013 Document Reviewed: 06/07/2011  [employer] Interactive Patient Education ©2016 [employer]    Hypomagnesemia  Hypomagnesemia is a condition in which the level of magnesium in the blood is low. Magnesium is a mineral that is found in many foods. It is used in many different processes in the body. Hypomagnesemia can affect every organ in the body. It can cause life-threatening problems.  CAUSES  Causes of hypomagnesemia include:  · Not getting enough magnesium in your diet.  · Malnutrition.  · Problems with absorbing magnesium from the intestines.  · Dehydration.  · Alcohol abuse.  · Vomiting.  · Severe diarrhea.  · Some medicines, including medicines that make you urinate more.  · Certain diseases, such as kidney disease, diabetes, and overactive thyroid.  SIGNS AND SYMPTOMS  · Involuntary shaking or trembling of a body part (tremor).  · Confusion.  · Muscle weakness.  · Sensitivity to light, sound, and touch.  · Psychiatric issues, such as depression, irritability, or psychosis.  · Sudden tightening of muscles (muscle spasms).  · Tingling in the arms and legs.  · A feeling of fluttering of the heart.  These symptoms are more severe if magnesium levels drop suddenly.  DIAGNOSIS  To make a diagnosis, your health care provider will do a physical exam and order blood and urine tests.  TREATMENT  Treatment will depend on the cause and the severity of  your condition. It may involve:  · A magnesium supplement. This can be taken in pill form. It can also be given through an IV tube. This is usually done if the condition is severe.  · Changes to your diet. You may be directed to eat foods that have a lot of magnesium, such as green leafy vegetables, peas, beans, and nuts.  · Eliminating alcohol from your diet.  HOME CARE INSTRUCTIONS  · Include foods with magnesium in your diet. Foods that are rich in magnesium include green vegetables, beans, nuts and seeds, and whole grains.  · Take medicines only as directed by your health care provider.  · Take magnesium supplements if your health care provider instructs you to do that. Take them as directed.  · Have your magnesium levels monitored as directed by your health care provider.  · When you are active, drink fluids that contain electrolytes.  · Keep all follow-up visits as directed by your health care provider. This is important.  SEEK MEDICAL CARE IF:  · You get worse instead of better.  · Your symptoms return.  SEEK IMMEDIATE MEDICAL CARE IF:  · Your symptoms are severe.     This information is not intended to replace advice given to you by your health care provider. Make sure you discuss any questions you have with your health care provider.     Document Released: 09/13/2006 Document Revised: 01/08/2016 Document Reviewed: 08/03/2015  [employer] Interactive Patient Education ©2016 [employer]    Chronic Diarrhea  Diarrhea is frequent loose and watery bowel movements. It can cause you to feel weak and dehydrated. Dehydration can cause you to become tired and thirsty and to have a dry mouth, decreased urination, and dark yellow urine. Diarrhea is a sign of another problem, most often an infection that will not last long. In most cases, diarrhea lasts 2-3 days. Diarrhea that lasts longer than 4 weeks is called long-lasting (chronic) diarrhea. It is important to treat your diarrhea as directed by your health care  provider to lessen or prevent future episodes of diarrhea.   CAUSES   There are many causes of chronic diarrhea. The following are some possible causes:   · Gastrointestinal infections caused by viruses, bacteria, or parasites.    · Food poisoning or food allergies.    · Certain medicines, such as antibiotics, chemotherapy, and laxatives.    · Artificial sweeteners and fructose.    · Digestive disorders, such as celiac disease and inflammatory bowel diseases.    · Irritable bowel syndrome.  · Some disorders of the pancreas.  · Disorders of the thyroid.  · Reduced blood flow to the intestines.  · Cancer.  Sometimes the cause of chronic diarrhea is unknown.  RISK FACTORS  · Having a severely weakened immune system, such as from HIV or AIDS.    · Taking certain types of cancer-fighting drugs (such as with chemotherapy) or other medicines.    · Having had a recent organ transplant.    · Having a portion of the stomach or small bowel removed.    · Traveling to countries where food and water supplies are often contaminated.    SYMPTOMS   In addition to frequent, loose stools, diarrhea may cause:   · Cramping.    · Abdominal pain.    · Nausea.    · Fever.  · Fatigue.  · Urgent need to use the bathroom.  · Loss of bowel control.  DIAGNOSIS   Your health care provider must take a careful history and perform a physical exam. Tests given are based on your symptoms and history. Tests may include:   · Blood or stool tests. Three or more stool samples may be examined. Stool cultures may be used to test for bacteria or parasites.    · X-rays.    · A procedure in which a thin tube is inserted into the mouth or rectum (endoscopy). This allows the health care provider to look inside the intestine.    TREATMENT   · Treatment is aimed at correcting the cause of the diarrhea when possible.  · Diarrhea caused by an infection can often be treated with antibiotic medicines.  · Diarrhea not caused by an infection may require you to take  long-term medicine or have surgery. Specific treatment should be discussed with your health care provider.  · If the cause cannot be determined, treatment aims to relieve symptoms and prevent dehydration. Serious health problems can occur if you do not maintain proper fluid levels. Treatment may include:  ¨ Taking an oral rehydration solution (ORS).  ¨ Not drinking beverages that contain caffeine (such as tea, coffee, and soft drinks).  ¨ Not drinking alcohol.  ¨ Maintaining well-balanced nutrition to help you recover faster.  HOME CARE INSTRUCTIONS   · Drink enough fluids to keep urine clear or pale yellow. Drink 1 cup (8 oz) of fluid for each diarrhea episode. Avoid fluids that contain simple sugars, fruit juices, whole milk products, and sodas. Hydrate with an ORS. You may purchase the ORS or prepare it at home by mixing the following ingredients together:  ¨  - tsp (1.7-3  mL) table salt.  ¨ ¾ tsp (3 ¾ mL) baking soda.  ¨  tsp (1.7 mL) salt substitute containing potassium chloride.  ¨ 1 tbsp (20 mL) sugar.  ¨ 4.2 c (1 L) of water.    · Certain foods and beverages may increase the speed at which food moves through the gastrointestinal (GI) tract. These foods and beverages should be avoided. They include:  ¨ Caffeinated and alcoholic beverages.  ¨ High-fiber foods, such as raw fruits and vegetables, nuts, seeds, and whole grain breads and cereals.  ¨ Foods and beverages sweetened with sugar alcohols, such as xylitol, sorbitol, and mannitol.    · Some foods may be well tolerated and may help thicken stool. These include:  ¨ Starchy foods, such as rice, toast, pasta, low-sugar cereal, oatmeal, grits, baked potatoes, crackers, and bagels.  ¨ Bananas.  ¨ Applesauce.  · Add probiotic-rich foods to help increase healthy bacteria in the GI tract. These include yogurt and fermented milk products.  · Wash your hands well after each diarrhea episode.  · Only take over-the-counter or prescription medicines as directed by  your health care provider.  · Take a warm bath to relieve any burning or pain from frequent diarrhea episodes.  SEEK MEDICAL CARE IF:   · You are not urinating as often.  · Your urine is a dark color.  · You become very tired or dizzy.  · You have severe pain in the abdomen or rectum.  · Your have blood or pus in your stools.  · Your stools look black and tarry.  SEEK IMMEDIATE MEDICAL CARE IF:   · You are unable to keep fluids down.  · You have persistent vomiting.  · You have blood in your stool.  · Your stools are black and tarry.  · You do not urinate in 6-8 hours, or there is only a small amount of very dark urine.  · You have abdominal pain that increases or localizes.  · You have weakness, dizziness, confusion, or lightheadedness.  · You have a severe headache.  · Your diarrhea gets worse or does not get better.  · You have a fever or persistent symptoms for more than 2-3 days.  · You have a fever and your symptoms suddenly get worse.  MAKE SURE YOU:   · Understand these instructions.  · Will watch your condition.  · Will get help right away if you are not doing well or get worse.     This information is not intended to replace advice given to you by your health care provider. Make sure you discuss any questions you have with your health care provider.     Document Released: 03/09/2005 Document Revised: 12/23/2014 Document Reviewed: 06/12/2014  [employer] Interactive Patient Education ©2016 [employer]      Your appointments     Jul 25, 2017  8:40 AM   Established Patient with Adryan Paz M.D.   [facility])    [address]  [facility] [address]   [phone]           You will be receiving a confirmation call a few days before your appointment from our automated call confirmation system.            Aug 14, 2017  1:30 PM   ACU GROUP with Dimas Horton M.D.   [facility] (--)    [address]  [facility]  [id number] [phone]              Follow-up Information     1. Follow up with Sol Danielle M.D. In 1 week.    Specialty:  Family Medicine    Contact information    [address]  [address]  [place] [address] [phone]          2. Follow up with [facility] metabolic profile, magnesium  in 5-7 days.         Discharge Medication Instructions:    Below are the medications your physician expects you to take upon discharge:    Review all your home medications and newly ordered medications with your doctor and/or pharmacist. Follow medication instructions as directed by your doctor and/or pharmacist.    Please keep your medication list with you and share with your physician.               Medication List      START taking these medications        Instructions    Morning Afternoon Evening Bedtime    calcium carbonate 500 MG Chew   Last time this was given:  1,000 mg on 7/19/2017 10:20 AM   Commonly known as:  TUMS        Take 1 Tab by mouth 2 Times a Day.   Dose:  500 mg                        Magnesium Oxide 400 MG Caps        Take 1 Cap by mouth 2 Times a Day.   Dose:  1 Cap                          CONTINUE taking these medications        Instructions    Morning Afternoon Evening Bedtime    CALCIUM + D PO        Take 2 Tabs by mouth every day.   Dose:  2 Tab                        Diclofenac Sodium 1 % Gel        Apply to small amount to affected area BID as needed for pain                        levothyroxine 75 MCG Tabs   Last time this was given:  75 mcg on 7/19/2017  5:35 AM   Commonly known as:  SYNTHROID        Take 1 Tab by mouth Every morning on an empty stomach.   Dose:  75 mcg                        METAMUCIL PO        Take 1 Dose by mouth 4 times a day.   Dose:  1 Dose                        pramoxine 1 % foam   Commonly known as:  PROCTOFOAM        Apply WA QID PRN                        PRILOSEC 20 MG delayed-release capsule   Generic drug:  omeprazole        Take 20 mg by mouth every day.      Dose:  20 mg                        therapeutic multivitamin-minerals Tabs        Take 1 Tab by mouth every day.   Dose:  1 Tab                        zolpidem 10 MG Tabs   Commonly known as:  AMBIEN        Take 1 Tab by mouth at bedtime as needed for Sleep.   Dose:  10 mg                             Where to Get Your Medications      These medications were sent to [facility] - [place], [place] [address]  [address] [place] [address]     Phone:  [phone]    - calcium carbonate 500 MG Chew  - Magnesium Oxide 400 MG Caps            Instructions           Diet / Nutrition:    Follow any diet instructions given to you by your doctor or the dietician, including how much salt (sodium) you are allowed each day.    If you are overweight, talk to your doctor about a weight reduction plan.    Activity:    Remain physically active following your doctor's instructions about exercise and activity.    Rest often.     Any time you become even a little tired or short of breath, SIT DOWN and rest.    Worsening Symptoms:    Report any of the following signs and symptoms to the doctor's office immediately:    *Pain of jaw, arm, or neck  *Chest pain not relieved by medication                               *Dizziness or loss of consciousness  *Difficulty breathing even when at rest   *More tired than usual                                       *Bleeding drainage or swelling of surgical site  *Swelling of feet, ankles, legs or stomach                 *Fever (>100ºF)  *Pink or blood tinged sputum  *Weight gain (3lbs/day or 5lbs /week)           *Shock from internal defibrillator (if applicable)  *Palpitations or irregular heartbeats                *Cool and/or numb extremities    Stroke Awareness    Common Risk Factors for Stroke include:    Age  Atrial Fibrillation  Carotid Artery Stenosis  Diabetes Mellitus  Excessive alcohol consumption  High blood pressure  Overweight   Physical  inactivity  Smoking    Warning signs and symptoms of a stroke include:    *Sudden numbness or weakness of the face, arm or leg (especially on one side of the body).  *Sudden confusion, trouble speaking or understanding.  *Sudden trouble seeing in one or both eyes.  *Sudden trouble walking, dizziness, loss of balance or coordination.Sudden severe headache with no known cause.    It is very important to get treatment quickly when a stroke occurs. If you experience any of the above warning signs, call 911 immediately.                   Disclaimer         Quit Smoking / Tobacco Use:    I understand the use of any tobacco products increases my chance of suffering from future heart disease or stroke and could cause other illnesses which may shorten my life. Quitting the use of tobacco products is the single most important thing I can do to improve my health. For further information on smoking / tobacco cessation call a Toll Free Quit Line at 1-269.431.8021 (*National Cancer Coden) or 1-729.689.9201 (American Lung Association) or you can access the web based program at www.lungAdChoice.org.    [place] Tobacco Users Help Line:  (544) 957-4211       Toll Free: 1-874.243.9178  Quit Tobacco Program [facility] (589)747-0118    Crisis Hotline:    [place] Crisis Hotline:  4-444-QMLYCFF or 1-758.293.5402    [place] Crisis Hotline:    1-135.917.8303 or 930-007-6984    Discharge Survey:   Thank you for choosing [facility]. We hope we did everything we could to make your hospital stay a pleasant one. You may be receiving a phone survey and we would appreciate your time and participation in answering the questions. Your input is very valuable to us in our efforts to improve our service to our patients and their families.        My signature on this form indicates that:    1. I have reviewed and understand the above information.  2. My questions regarding this information have been answered to my  satisfaction.  3. I have formulated a plan with my discharge nurse to obtain my prescribed medications for home.                  Disclaimer         __________________________________                     __________       ________                       Patient Signature                                                 Date                    Time

## 2017-07-18 NOTE — IP AVS SNAPSHOT
" <p align=\"LEFT\"><IMG SRC=\"//[email or web address]$/[email or web address]\" alt=\"Image\" WIDTH=\"50%\" HEIGHT=\"200\" BORDER=\"\"></p>                   Name:Mamie Michaels  Medical Record Number:[id number]  CSN: [id number]    YOB: 1950   Age: 66 y.o.  Sex: female  HT:1.549 m (5' 1\") WT: 47.5 kg (104 lb 11.5 oz)          Admit Date: 7/18/2017     Discharge Date:   Today's Date: 7/19/2017  Attending Doctor:  Preethi Hawk M.D.                  Allergies:  Review of patient's allergies indicates no known allergies.          Your appointments     Jul 25, 2017  8:40 AM   Established Patient with Adryan Paz M.D.   [facility])    [address]  [facility] [address] [phone]           You will be receiving a confirmation call a few days before your appointment from our automated call confirmation system.            Aug 14, 2017  1:30 PM   ACU GROUP with Dimas Horton M.D.   [facility] (--)    [address]  [facility] [address] [phone]              Follow-up Information     1. Follow up with Sol Danielle M.D. In 1 week.    Specialty:  Family Medicine    Contact information    [address]  [facility] [id number] [phone]          2. Follow up with [facility] metabolic profile, magnesium  in 5-7 days.         Medication List      Take these Medications        Instructions    CALCIUM + D PO    Take 2 Tabs by mouth every day.   Dose:  2 Tab       calcium carbonate 500 MG Chew   Commonly known as:  TUMS    Take 1 Tab by mouth 2 Times a Day.   Dose:  500 mg       Diclofenac Sodium 1 % Gel    Apply to small amount to affected area BID as needed for pain       levothyroxine 75 MCG Tabs   Commonly known as:  SYNTHROID    Take 1 Tab by mouth Every morning on an empty stomach.   Dose:  75 mcg       Magnesium Oxide 400 MG Caps    Take 1 Cap by mouth 2 Times a Day.   Dose:  1 Cap       METAMUCIL PO    Take 1 Dose by " mouth 4 times a day.   Dose:  1 Dose       pramoxine 1 % foam   Commonly known as:  PROCTOFOAM    Apply IA QID PRN       PRILOSEC 20 MG delayed-release capsule   Generic drug:  omeprazole    Take 20 mg by mouth every day.   Dose:  20 mg       therapeutic multivitamin-minerals Tabs    Take 1 Tab by mouth every day.   Dose:  1 Tab       zolpidem 10 MG Tabs   Commonly known as:  AMBIEN    Take 1 Tab by mouth at bedtime as needed for Sleep.   Dose:  10 mg

## 2017-07-19 ENCOUNTER — TELEPHONE (OUTPATIENT)
Dept: MEDICAL GROUP | Age: 67
End: 2017-07-19

## 2017-07-19 ENCOUNTER — PATIENT OUTREACH (OUTPATIENT)
Dept: HEALTH INFORMATION MANAGEMENT | Facility: OTHER | Age: 67
End: 2017-07-19

## 2017-07-19 ENCOUNTER — RESOLUTE PROFESSIONAL BILLING HOSPITAL PROF FEE (OUTPATIENT)
Dept: HOSPITALIST | Facility: MEDICAL CENTER | Age: 67
End: 2017-07-19
Payer: MEDICARE

## 2017-07-19 VITALS
DIASTOLIC BLOOD PRESSURE: 77 MMHG | RESPIRATION RATE: 16 BRPM | SYSTOLIC BLOOD PRESSURE: 144 MMHG | HEIGHT: 61 IN | OXYGEN SATURATION: 98 % | TEMPERATURE: 97.5 F | HEART RATE: 72 BPM | BODY MASS INDEX: 19.77 KG/M2 | WEIGHT: 104.72 LBS

## 2017-07-19 PROBLEM — K52.9 CHRONIC DIARRHEA: Status: ACTIVE | Noted: 2017-07-19

## 2017-07-19 PROBLEM — E83.42 HYPOMAGNESEMIA WITH SECONDARY HYPOCALCEMIA: Status: ACTIVE | Noted: 2017-07-19

## 2017-07-19 PROBLEM — E83.51 HYPOMAGNESEMIA WITH SECONDARY HYPOCALCEMIA: Status: ACTIVE | Noted: 2017-07-19

## 2017-07-19 LAB
ALBUMIN SERPL BCP-MCNC: 3 G/DL (ref 3.2–4.9)
ALBUMIN/GLOB SERPL: 1.4 G/DL
ALP SERPL-CCNC: 108 U/L (ref 30–99)
ALT SERPL-CCNC: 33 U/L (ref 2–50)
ANION GAP SERPL CALC-SCNC: 7 MMOL/L (ref 0–11.9)
ANION GAP SERPL CALC-SCNC: 9 MMOL/L (ref 0–11.9)
AST SERPL-CCNC: 43 U/L (ref 12–45)
BILIRUB SERPL-MCNC: 0.3 MG/DL (ref 0.1–1.5)
BUN SERPL-MCNC: 10 MG/DL (ref 8–22)
BUN SERPL-MCNC: 13 MG/DL (ref 8–22)
CALCIUM SERPL-MCNC: 6.1 MG/DL (ref 8.4–10.2)
CALCIUM SERPL-MCNC: 6.2 MG/DL (ref 8.4–10.2)
CHLORIDE SERPL-SCNC: 107 MMOL/L (ref 96–112)
CHLORIDE SERPL-SCNC: 107 MMOL/L (ref 96–112)
CO2 SERPL-SCNC: 21 MMOL/L (ref 20–33)
CO2 SERPL-SCNC: 24 MMOL/L (ref 20–33)
CREAT SERPL-MCNC: 0.78 MG/DL (ref 0.5–1.4)
CREAT SERPL-MCNC: 0.88 MG/DL (ref 0.5–1.4)
GFR SERPL CREATININE-BSD FRML MDRD: >60 ML/MIN/1.73 M 2
GFR SERPL CREATININE-BSD FRML MDRD: >60 ML/MIN/1.73 M 2
GLOBULIN SER CALC-MCNC: 2.2 G/DL (ref 1.9–3.5)
GLUCOSE SERPL-MCNC: 118 MG/DL (ref 65–99)
GLUCOSE SERPL-MCNC: 92 MG/DL (ref 65–99)
MAGNESIUM SERPL-MCNC: 0.3 MG/DL (ref 1.5–2.5)
MAGNESIUM SERPL-MCNC: 2.2 MG/DL (ref 1.5–2.5)
NUCLEAR IGG SER QL IA: NORMAL
POTASSIUM SERPL-SCNC: 3.6 MMOL/L (ref 3.6–5.5)
POTASSIUM SERPL-SCNC: 4 MMOL/L (ref 3.6–5.5)
PROT SERPL-MCNC: 5.2 G/DL (ref 6–8.2)
SODIUM SERPL-SCNC: 137 MMOL/L (ref 135–145)
SODIUM SERPL-SCNC: 138 MMOL/L (ref 135–145)

## 2017-07-19 PROCEDURE — A9270 NON-COVERED ITEM OR SERVICE: HCPCS | Performed by: INTERNAL MEDICINE

## 2017-07-19 PROCEDURE — 770020 HCHG ROOM/CARE - TELE (206)

## 2017-07-19 PROCEDURE — 83735 ASSAY OF MAGNESIUM: CPT

## 2017-07-19 PROCEDURE — 96374 THER/PROPH/DIAG INJ IV PUSH: CPT

## 2017-07-19 PROCEDURE — 99235 HOSP IP/OBS SAME DATE MOD 70: CPT | Performed by: INTERNAL MEDICINE

## 2017-07-19 PROCEDURE — A9270 NON-COVERED ITEM OR SERVICE: HCPCS | Performed by: HOSPITALIST

## 2017-07-19 PROCEDURE — 700111 HCHG RX REV CODE 636 W/ 250 OVERRIDE (IP): Performed by: INTERNAL MEDICINE

## 2017-07-19 PROCEDURE — 700102 HCHG RX REV CODE 250 W/ 637 OVERRIDE(OP): Performed by: INTERNAL MEDICINE

## 2017-07-19 PROCEDURE — 99235 HOSP IP/OBS SAME DATE MOD 70: CPT | Performed by: HOSPITALIST

## 2017-07-19 PROCEDURE — 80048 BASIC METABOLIC PNL TOTAL CA: CPT

## 2017-07-19 PROCEDURE — 700102 HCHG RX REV CODE 250 W/ 637 OVERRIDE(OP): Performed by: HOSPITALIST

## 2017-07-19 RX ORDER — CALCIUM CARBONATE/VITAMIN D3 500-10/5ML
1 LIQUID (ML) ORAL 2 TIMES DAILY
Qty: 60 CAP | Refills: 1 | Status: SHIPPED | OUTPATIENT
Start: 2017-07-19

## 2017-07-19 RX ORDER — ONDANSETRON 4 MG/1
4 TABLET, ORALLY DISINTEGRATING ORAL EVERY 4 HOURS PRN
Status: DISCONTINUED | OUTPATIENT
Start: 2017-07-19 | End: 2017-07-19 | Stop reason: HOSPADM

## 2017-07-19 RX ORDER — DICYCLOMINE HCL 20 MG
20 TABLET ORAL EVERY 6 HOURS
Status: DISCONTINUED | OUTPATIENT
Start: 2017-07-19 | End: 2017-07-19 | Stop reason: HOSPADM

## 2017-07-19 RX ORDER — ONDANSETRON 2 MG/ML
4 INJECTION INTRAMUSCULAR; INTRAVENOUS EVERY 4 HOURS PRN
Status: DISCONTINUED | OUTPATIENT
Start: 2017-07-19 | End: 2017-07-19 | Stop reason: HOSPADM

## 2017-07-19 RX ORDER — CALCIUM CARBONATE 500 MG/1
1000 TABLET, CHEWABLE ORAL 2 TIMES DAILY
Status: DISCONTINUED | OUTPATIENT
Start: 2017-07-19 | End: 2017-07-19 | Stop reason: HOSPADM

## 2017-07-19 RX ORDER — CALCIUM CARBONATE 500 MG/1
500 TABLET, CHEWABLE ORAL 2 TIMES DAILY
Qty: 60 TAB | Refills: 1 | Status: SHIPPED | OUTPATIENT
Start: 2017-07-19

## 2017-07-19 RX ORDER — MAGNESIUM SULFATE HEPTAHYDRATE 40 MG/ML
4 INJECTION, SOLUTION INTRAVENOUS ONCE
Status: COMPLETED | OUTPATIENT
Start: 2017-07-19 | End: 2017-07-19

## 2017-07-19 RX ORDER — AMITRIPTYLINE HYDROCHLORIDE 25 MG/1
25-50 TABLET, FILM COATED ORAL NIGHTLY PRN
Status: DISCONTINUED | OUTPATIENT
Start: 2017-07-19 | End: 2017-07-19

## 2017-07-19 RX ORDER — COLESTIPOL HYDROCHLORIDE 5 G/5G
5 GRANULE, FOR SUSPENSION ORAL DAILY
Status: DISCONTINUED | OUTPATIENT
Start: 2017-07-19 | End: 2017-07-19 | Stop reason: HOSPADM

## 2017-07-19 RX ORDER — AMITRIPTYLINE HYDROCHLORIDE 50 MG/1
50 TABLET, FILM COATED ORAL NIGHTLY PRN
Status: DISCONTINUED | OUTPATIENT
Start: 2017-07-19 | End: 2017-07-19 | Stop reason: HOSPADM

## 2017-07-19 RX ORDER — AMITRIPTYLINE HYDROCHLORIDE 25 MG/1
25 TABLET, FILM COATED ORAL NIGHTLY PRN
Status: DISCONTINUED | OUTPATIENT
Start: 2017-07-19 | End: 2017-07-19 | Stop reason: HOSPADM

## 2017-07-19 RX ORDER — LEVOTHYROXINE SODIUM 0.05 MG/1
75 TABLET ORAL
Status: DISCONTINUED | OUTPATIENT
Start: 2017-07-19 | End: 2017-07-19 | Stop reason: HOSPADM

## 2017-07-19 RX ORDER — CALCIUM CARBONATE/VITAMIN D3 500-10/5ML
1 LIQUID (ML) ORAL 2 TIMES DAILY
Qty: 60 CAP | Refills: 1 | Status: SHIPPED | OUTPATIENT
Start: 2017-07-19 | End: 2017-07-19

## 2017-07-19 RX ORDER — ACETAMINOPHEN 325 MG/1
650 TABLET ORAL EVERY 6 HOURS PRN
Status: DISCONTINUED | OUTPATIENT
Start: 2017-07-19 | End: 2017-07-19 | Stop reason: HOSPADM

## 2017-07-19 RX ORDER — OMEPRAZOLE 20 MG/1
20 CAPSULE, DELAYED RELEASE ORAL DAILY
Status: DISCONTINUED | OUTPATIENT
Start: 2017-07-19 | End: 2017-07-19 | Stop reason: HOSPADM

## 2017-07-19 RX ADMIN — LEVOTHYROXINE SODIUM 75 MCG: 50 TABLET ORAL at 05:35

## 2017-07-19 RX ADMIN — CALCIUM CARBONATE (ANTACID) CHEW TAB 500 MG 1000 MG: 500 CHEW TAB at 10:20

## 2017-07-19 RX ADMIN — MAGNESIUM SULFATE HEPTAHYDRATE 4 G: 40 INJECTION, SOLUTION INTRAVENOUS at 02:57

## 2017-07-19 ASSESSMENT — ENCOUNTER SYMPTOMS
NAUSEA: 0
DIAPHORESIS: 0
HEADACHES: 0
CHILLS: 0
SHORTNESS OF BREATH: 0
ARTHRALGIAS: 0
ABDOMINAL PAIN: 0
DIZZINESS: 0
ROS GI COMMENTS: CHRONIC DIARRHEA
CLAUDICATION: 0
HEARTBURN: 0
PALPITATIONS: 0
VOMITING: 0
WEAKNESS: 0
WHEEZING: 0
FEVER: 0
MYALGIAS: 0
BRUISES/BLEEDS EASILY: 0
BLOOD IN STOOL: 0
COUGH: 0
DIARRHEA: 1

## 2017-07-19 ASSESSMENT — LIFESTYLE VARIABLES
TOTAL SCORE: 0
EVER FELT BAD OR GUILTY ABOUT YOUR DRINKING: NO
CONSUMPTION TOTAL: NEGATIVE
HAVE YOU EVER FELT YOU SHOULD CUT DOWN ON YOUR DRINKING: NO
HAVE PEOPLE ANNOYED YOU BY CRITICIZING YOUR DRINKING: NO
ON A TYPICAL DAY WHEN YOU DRINK ALCOHOL HOW MANY DRINKS DO YOU HAVE: 1
TOTAL SCORE: 0
EVER_SMOKED: NEVER
AVERAGE NUMBER OF DAYS PER WEEK YOU HAVE A DRINK CONTAINING ALCOHOL: 1
ALCOHOL_USE: YES
EVER_SMOKED: NEVER
EVER HAD A DRINK FIRST THING IN THE MORNING TO STEADY YOUR NERVES TO GET RID OF A HANGOVER: NO
TOTAL SCORE: 0
HOW MANY TIMES IN THE PAST YEAR HAVE YOU HAD 5 OR MORE DRINKS IN A DAY: 0

## 2017-07-19 ASSESSMENT — PAIN SCALES - GENERAL
PAINLEVEL_OUTOF10: 0
PAINLEVEL_OUTOF10: 0

## 2017-07-19 NOTE — PROGRESS NOTES
Received bedside report from Kunal RN. Assumed care of pt who is up to the restroom by self, steady gait. RR even/unlabored. Fall protocol in place. CLIP. Will continue to monitor.
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
## 2017-07-19 NOTE — CARE PLAN
Problem: Communication  Goal: The ability to communicate needs accurately and effectively will improve  Outcome: PROGRESSING AS EXPECTED  Discussed hourly rounding and call light with patient. Verbalized understanding. Pt aaox4.     Problem: Safety  Goal: Will remain free from falls  Intervention: Implement fall precautions    07/19/17 6644   OTHER   Environmental Precautions Treaded Slipper Socks on Patient;Personal Belongings, Wastebasket, Call Bell etc. in Easy Reach;Transferred to Stronger Side;Report Given to Other Health Care Providers Regarding Fall Risk;Bed in Low Position;Communication Sign for Patients & Families;Mobility Assessed & Appropriate Sign Placed   IV Pole on Same Side of Bed as Bathroom Yes   Bedrails Bedrails Closest to Bathroom Down     Pt up self, steady gait. Non skid socks in place, using IV pole. Pt aaox4.

## 2017-07-19 NOTE — PROGRESS NOTES
Bedside report given to Juli DOLL. Plan of care discussed. Pt resting in bed with safety precautions in place.

## 2017-07-19 NOTE — PROGRESS NOTES
Assessment completed. See flowsheet. Meds given per MAR. Pt declined pneumonia vaccination at this time, stating that she would have it in the morning. Pt resting in bed with safety precautions in place.

## 2017-07-19 NOTE — PROGRESS NOTES
Telemetry Summary    Rhythm Sinus rhythm  HR 60s-70s  Ectopy Rare PVC's  RI .16  QRS .08  QT .44

## 2017-07-19 NOTE — ED NOTES
Adrienne from Lab called with critical result of calcium of 6.1 and Magnesium of 0.3 at 0004. Critical lab result read back to Adrienne.   Dr. Tello notified of critical lab result at 0006.  Critical lab result read back by Dr. Tello.

## 2017-07-19 NOTE — CARE PLAN
Problem: Bowel/Gastric:  Goal: Normal bowel function is maintained or improved  Outcome: PROGRESSING AS EXPECTED  Pt educated on the importance of proper nutrition and fluid intake for healthy bowel motility. Pt's bowel patterns assessed and monitored.     Problem: Knowledge Deficit  Goal: Knowledge of disease process/condition, treatment plan, diagnostic tests, and medications will improve  Outcome: PROGRESSING AS EXPECTED  Plan of care discussed. Pt's questions answered.

## 2017-07-19 NOTE — DISCHARGE INSTRUCTIONS
Discharge Instructions    Discharged to home by car with relative. Discharged via wheelchair, hospital escort: Yes.  Special equipment needed: Not Applicable    Be sure to schedule a follow-up appointment with your primary care doctor or any specialists as instructed.     Discharge Plan:   Diet Plan: Discussed  Activity Level: Discussed  Confirmed Follow up Appointment: Appointment Scheduled  Confirmed Symptoms Management: Discussed  Medication Reconciliation Updated: Yes  Influenza Vaccine Indication: Not indicated: Previously immunized this influenza season and > 8 years of age    I understand that a diet low in cholesterol, fat, and sodium is recommended for good health. Unless I have been given specific instructions below for another diet, I accept this instruction as my diet prescription.   Other diet: None    Special Instructions: None    · Is patient discharged on Warfarin / Coumadin?   No     · Is patient Post Blood Transfusion?  No    Depression / Suicide Risk    As you are discharged from this [facility] facility, it is important to learn how to keep safe from harming yourself.    Recognize the warning signs:  · Abrupt changes in personality, positive or negative- including increase in energy   · Giving away possessions  · Change in eating patterns- significant weight changes-  positive or negative  · Change in sleeping patterns- unable to sleep or sleeping all the time   · Unwillingness or inability to communicate  · Depression  · Unusual sadness, discouragement and loneliness  · Talk of wanting to die  · Neglect of personal appearance   · Rebelliousness- reckless behavior  · Withdrawal from people/activities they love  · Confusion- inability to concentrate     If you or a loved one observes any of these behaviors or has concerns about self-harm, here's what you can do:  · Talk about it- your feelings and reasons for harming yourself  · Remove any means that you might use to hurt yourself (examples:  pills, rope, extension cords, firearm)  · Get professional help from the community (Mental Health, Substance Abuse, psychological counseling)  · Do not be alone:Call your Safe Contact- someone whom you trust who will be there for you.  · Call your local CRISIS HOTLINE 348-1140 or 778-117-0236  · Call your local Children's Mobile Crisis Response Team [place] (233) 945-3810 or www."[employer]"  · Call the toll free National Suicide Prevention Hotlines   · National Suicide Prevention Lifeline 945-427-SIFU (8172)  · [employer] Line Network 800-SUICIDE (849-4754)    Hypocalcemia, Adult  Hypocalcemia is low blood calcium. Calcium is important for cells to function in the body. Low blood calcium can cause a variety of symptoms and problems.  CAUSES   · Low levels of a body protein called albumin.  · Problems with the parathyroid glands or surgical removal of the parathyroid glands. The parathyroid glands maintain the body's level of calcium.  · Decreased production or improper use of parathyroid hormone.  · Lack (deficiency) of vitamin D or magnesium or both.  · Intestinal problems that interfere with nutrient absorption.  · Alcoholism.  · Kidney problems.  · Inflammation of the pancreas (pancreatitis).  · Certain medicines.  · Severe infections (sepsis).  · Infiltrative diseases. With these diseases the parathyroid glands are filled with cells or substances that are not normally present. Examples include:  · Sarcoidosis.  · Hemachromatosis.  · Breakdown of large amounts of muscle fiber.  · High levels of phosphate in the body.  · Cancer.  · Massive blood transfusions which usually occur with severe trauma.  SYMPTOMS   · Numbness and tingling in the fingers, toes, or around the mouth.  · Muscle aches or cramps, especially in the legs, feet, and back.  · Muscle twitches.  · Shortness of breath or wheezing.  · Difficulty swallowing.  · Changes in the sound of the voice.  · General weakness.  · Fainting.  · Fast  heart beats (palpitations).  · Chest pain.  · Irritability.  · Difficulty thinking.  · Memory problems or confusion.  · Severe fatigue.  · Changes in personality.  · Depression and anxiety.  · Shaking uncontrollably (seizures).  · Coarse, brittle hair and nails.  · Dry skin or lasting (chronic) skin diseases (psoriasis, eczema, or dermatitis).  · Clouding of the eye lens (cataracts).  · Abdominal cramping or pain.  DIAGNOSIS   Hypocalcemia is usually diagnosed through blood tests that reveal a low level of blood calcium. Other tests, such as a recording of the electrical activity of the heart (electrocardiogram, EKG), may be performed in order to diagnose the underlying cause of the condition.  TREATMENT   Treatment for hypocalcemia includes giving calcium supplements. These can be given by mouth or by intravenous (IV) access tube, depending on the severity of the symptoms and deficiency. Other minerals (electrolytes), such as magnesium, may also be given.  HOME CARE INSTRUCTIONS   · Meet with a dietitian to make sure you are eating the most healthful diet possible, or follow diet instructions as directed by your caregiver.  · Follow up with your caregiver as directed.  SEEK IMMEDIATE MEDICAL CARE IF:   · You develop chest pain.  · You develop persistent rapid or irregular heartbeats.  · You have difficulty breathing.  · You faint.  · You develop increased fatigue.  · You have new swelling in the feet, ankles, or legs.  · You develop increased muscle twitching.  · You start to have seizures.  · You develop confusion.  · You develop mood, memory, or personality changes.  MAKE SURE YOU:   · Understand these instructions.  · Will watch your condition.  · Will get help right away if you are not doing well or get worse.     This information is not intended to replace advice given to you by your health care provider. Make sure you discuss any questions you have with your health care provider.     Document Released:  06/07/2011 Document Revised: 03/11/2013 Document Reviewed: 06/07/2011  [employer] Interactive Patient Education ©2016 [employer]    Hypomagnesemia  Hypomagnesemia is a condition in which the level of magnesium in the blood is low. Magnesium is a mineral that is found in many foods. It is used in many different processes in the body. Hypomagnesemia can affect every organ in the body. It can cause life-threatening problems.  CAUSES  Causes of hypomagnesemia include:  · Not getting enough magnesium in your diet.  · Malnutrition.  · Problems with absorbing magnesium from the intestines.  · Dehydration.  · Alcohol abuse.  · Vomiting.  · Severe diarrhea.  · Some medicines, including medicines that make you urinate more.  · Certain diseases, such as kidney disease, diabetes, and overactive thyroid.  SIGNS AND SYMPTOMS  · Involuntary shaking or trembling of a body part (tremor).  · Confusion.  · Muscle weakness.  · Sensitivity to light, sound, and touch.  · Psychiatric issues, such as depression, irritability, or psychosis.  · Sudden tightening of muscles (muscle spasms).  · Tingling in the arms and legs.  · A feeling of fluttering of the heart.  These symptoms are more severe if magnesium levels drop suddenly.  DIAGNOSIS  To make a diagnosis, your health care provider will do a physical exam and order blood and urine tests.  TREATMENT  Treatment will depend on the cause and the severity of your condition. It may involve:  · A magnesium supplement. This can be taken in pill form. It can also be given through an IV tube. This is usually done if the condition is severe.  · Changes to your diet. You may be directed to eat foods that have a lot of magnesium, such as green leafy vegetables, peas, beans, and nuts.  · Eliminating alcohol from your diet.  HOME CARE INSTRUCTIONS  · Include foods with magnesium in your diet. Foods that are rich in magnesium include green vegetables, beans, nuts and seeds, and whole grains.  · Take  medicines only as directed by your health care provider.  · Take magnesium supplements if your health care provider instructs you to do that. Take them as directed.  · Have your magnesium levels monitored as directed by your health care provider.  · When you are active, drink fluids that contain electrolytes.  · Keep all follow-up visits as directed by your health care provider. This is important.  SEEK MEDICAL CARE IF:  · You get worse instead of better.  · Your symptoms return.  SEEK IMMEDIATE MEDICAL CARE IF:  · Your symptoms are severe.     This information is not intended to replace advice given to you by your health care provider. Make sure you discuss any questions you have with your health care provider.     Document Released: 09/13/2006 Document Revised: 01/08/2016 Document Reviewed: 08/03/2015  [employer] Interactive Patient Education ©2016 [employer].    Chronic Diarrhea  Diarrhea is frequent loose and watery bowel movements. It can cause you to feel weak and dehydrated. Dehydration can cause you to become tired and thirsty and to have a dry mouth, decreased urination, and dark yellow urine. Diarrhea is a sign of another problem, most often an infection that will not last long. In most cases, diarrhea lasts 2-3 days. Diarrhea that lasts longer than 4 weeks is called long-lasting (chronic) diarrhea. It is important to treat your diarrhea as directed by your health care provider to lessen or prevent future episodes of diarrhea.   CAUSES   There are many causes of chronic diarrhea. The following are some possible causes:   · Gastrointestinal infections caused by viruses, bacteria, or parasites.    · Food poisoning or food allergies.    · Certain medicines, such as antibiotics, chemotherapy, and laxatives.    · Artificial sweeteners and fructose.    · Digestive disorders, such as celiac disease and inflammatory bowel diseases.    · Irritable bowel syndrome.  · Some disorders of the pancreas.  · Disorders of  the thyroid.  · Reduced blood flow to the intestines.  · Cancer.  Sometimes the cause of chronic diarrhea is unknown.  RISK FACTORS  · Having a severely weakened immune system, such as from HIV or AIDS.    · Taking certain types of cancer-fighting drugs (such as with chemotherapy) or other medicines.    · Having had a recent organ transplant.    · Having a portion of the stomach or small bowel removed.    · Traveling to countries where food and water supplies are often contaminated.    SYMPTOMS   In addition to frequent, loose stools, diarrhea may cause:   · Cramping.    · Abdominal pain.    · Nausea.    · Fever.  · Fatigue.  · Urgent need to use the bathroom.  · Loss of bowel control.  DIAGNOSIS   Your health care provider must take a careful history and perform a physical exam. Tests given are based on your symptoms and history. Tests may include:   · Blood or stool tests. Three or more stool samples may be examined. Stool cultures may be used to test for bacteria or parasites.    · X-rays.    · A procedure in which a thin tube is inserted into the mouth or rectum (endoscopy). This allows the health care provider to look inside the intestine.    TREATMENT   · Treatment is aimed at correcting the cause of the diarrhea when possible.  · Diarrhea caused by an infection can often be treated with antibiotic medicines.  · Diarrhea not caused by an infection may require you to take long-term medicine or have surgery. Specific treatment should be discussed with your health care provider.  · If the cause cannot be determined, treatment aims to relieve symptoms and prevent dehydration. Serious health problems can occur if you do not maintain proper fluid levels. Treatment may include:  ¨ Taking an oral rehydration solution (ORS).  ¨ Not drinking beverages that contain caffeine (such as tea, coffee, and soft drinks).  ¨ Not drinking alcohol.  ¨ Maintaining well-balanced nutrition to help you recover faster.  HOME CARE  INSTRUCTIONS   · Drink enough fluids to keep urine clear or pale yellow. Drink 1 cup (8 oz) of fluid for each diarrhea episode. Avoid fluids that contain simple sugars, fruit juices, whole milk products, and sodas. Hydrate with an ORS. You may purchase the ORS or prepare it at home by mixing the following ingredients together:  ¨  - tsp (1.7-3  mL) table salt.  ¨ ¾ tsp (3 ¾ mL) baking soda.  ¨  tsp (1.7 mL) salt substitute containing potassium chloride.  ¨ 1 tbsp (20 mL) sugar.  ¨ 4.2 c (1 L) of water.    · Certain foods and beverages may increase the speed at which food moves through the gastrointestinal (GI) tract. These foods and beverages should be avoided. They include:  ¨ Caffeinated and alcoholic beverages.  ¨ High-fiber foods, such as raw fruits and vegetables, nuts, seeds, and whole grain breads and cereals.  ¨ Foods and beverages sweetened with sugar alcohols, such as xylitol, sorbitol, and mannitol.    · Some foods may be well tolerated and may help thicken stool. These include:  ¨ Starchy foods, such as rice, toast, pasta, low-sugar cereal, oatmeal, grits, baked potatoes, crackers, and bagels.  ¨ Bananas.  ¨ Applesauce.  · Add probiotic-rich foods to help increase healthy bacteria in the GI tract. These include yogurt and fermented milk products.  · Wash your hands well after each diarrhea episode.  · Only take over-the-counter or prescription medicines as directed by your health care provider.  · Take a warm bath to relieve any burning or pain from frequent diarrhea episodes.  SEEK MEDICAL CARE IF:   · You are not urinating as often.  · Your urine is a dark color.  · You become very tired or dizzy.  · You have severe pain in the abdomen or rectum.  · Your have blood or pus in your stools.  · Your stools look black and tarry.  SEEK IMMEDIATE MEDICAL CARE IF:   · You are unable to keep fluids down.  · You have persistent vomiting.  · You have blood in your stool.  · Your stools are black and tarry.  · You  do not urinate in 6-8 hours, or there is only a small amount of very dark urine.  · You have abdominal pain that increases or localizes.  · You have weakness, dizziness, confusion, or lightheadedness.  · You have a severe headache.  · Your diarrhea gets worse or does not get better.  · You have a fever or persistent symptoms for more than 2-3 days.  · You have a fever and your symptoms suddenly get worse.  MAKE SURE YOU:   · Understand these instructions.  · Will watch your condition.  · Will get help right away if you are not doing well or get worse.     This information is not intended to replace advice given to you by your health care provider. Make sure you discuss any questions you have with your health care provider.     Document Released: 03/09/2005 Document Revised: 12/23/2014 Document Reviewed: 06/12/2014  [employer] Interactive Patient Education ©2016 [employer]

## 2017-07-19 NOTE — PROGRESS NOTES
Pt resting in bed watching television. Pt has no complaints at this time. Safety precautions in place.

## 2017-07-19 NOTE — TELEPHONE ENCOUNTER
I called patient again this am.  She was admitted overnight for IV magnesium repletion.  She remains asymptomatic.      Armando Jacobo MD  [facility]  [address] [place] [id number]

## 2017-07-19 NOTE — PROGRESS NOTES
Discharge instructions provided and discussed. Verbalized understanding. Went over escripts and side effects of medications. Verbalized understanding. Pt refused wheelchair but CNA walked patient out.

## 2017-07-19 NOTE — TELEPHONE ENCOUNTER
I was called by lab and informed  of a critically low magnesium level.  I called patient and instructed her to be driven to the ER.  She agreed to be seen in the ER now, and stated that she was   asymptomatic. We discussed all physiological effects of low magnesium.      Aramndo Jacobo MD  [facility]  [address] [place] [id number]

## 2017-07-19 NOTE — ASSESSMENT & PLAN NOTE
Given her chronic diarrhea I will replace her magnesium intravenously  I will order oral calcium replacement

## 2017-07-19 NOTE — DISCHARGE PLANNING
Medical Social Work    Referral: Pt discussed at IDT rounds this AM.    Intervention: Per flowsheet, pt lives with spouse and expects to d.c home.  No SS needs identified nor any requests for CHAVA Ken during rounds.      Plan: SW available for any assistance with d.c planning.

## 2017-07-19 NOTE — PROGRESS NOTES
Assessment completed, declines medication. Plan of care discussed. Bed in low position. IV patent and flushing well. Fall protocol in place. Pt up self, steady gait.

## 2017-07-19 NOTE — ASSESSMENT & PLAN NOTE
After partial bowel resection years ago for recurrent bleeding diverticulosis, I will continue her cholestid and bentyl  She will follow up with her GI doctor Kenny after discharge

## 2017-07-19 NOTE — ED NOTES
"Chief Complaint   Patient presents with   • Abnormal Labs     Pt was called by PMD to go to ER for critical magnesium level.     /83 mmHg  Pulse 71  Temp(Src) 36.5 °C (97.7 °F)  Resp 18  Ht 1.549 m (5' 1\")  Wt 47.5 kg (104 lb 11.5 oz)  BMI 19.80 kg/m2  SpO2 96%    "

## 2017-07-19 NOTE — DISCHARGE PLANNING
Care Transition Team Assessment    IHD met with patient bedside. She stated she lives with her  and is normally able to drive herself. She does not use any DME, O2 or services at home and does not expect to go home with anything. She stated she feels pretty great and expects to be able to drive herself home after discharge.     Information Source  Orientation : Oriented x 4  Information Given By: Patient  Informant's Name: Mamie  Who is responsible for making decisions for patient? : Patient    Readmission Evaluation  Is this a readmission?: No    Elopement Risk  Legal Hold: No  Ambulatory or Self Mobile in Wheelchair: Yes  Disoriented: No  Psychiatric Symptoms: None  History of Wandering: No  Elopement this Admit: No  Vocalizing Wanting to Leave: No  Displays Behaviors, Body Language Wanting to Leave: No-Not at Risk for Elopement  Elopement Risk: Not at Risk for Elopement    Interdisciplinary Discharge Planning  Does Admitting Nurse Feel This Could be a Complex Discharge?: No  Primary Care Physician: Sol Danielle  Lives with - Patient's Self Care Capacity: Spouse  Patient or legal guardian wants to designate a caregiver (see row info): No  Support Systems: Spouse / Significant Other, Friends / Neighbors  Housing / Facility: [address] [facility]  Do You Take your Prescribed Medications Regularly: Yes  Able to Return to Previous ADL's: Yes  Mobility Issues: No  Prior Services: None  Patient Expects to be Discharged to:: Home  Assistance Needed: No  Durable Medical Equipment: Not Applicable    Discharge Preparedness  What is your plan after discharge?: Home with help  What are your discharge supports?: Spouse  Prior Functional Level: Ambulatory, Drives Self, Independent with Activities of Daily Living, Independent with Medication Management  Difficulity with ADLs: None  Difficulity with IADLs: None    Functional Assesment  Prior Functional Level: Ambulatory, Drives Self, Independent with Activities of Daily Living,  Independent with Medication Management    Finances  Financial Barriers to Discharge: No  Prescription Coverage: Yes ([facility] on [address])    Vision / Hearing Impairment  Vision Impairment : Yes  Right Eye Vision: Impaired, Wears Glasses  Left Eye Vision: Impaired, Wears Glasses  Hearing Impairment : Yes  Hearing Impairment: Both Ears, Hearing Device Not Available  Does Pt Need Special Equipment for the Hearing Impaired?: No    Values / Beliefs / Concerns  Values / Beliefs Concerns : No         Domestic Abuse  Have you ever been the victim of abuse or violence?: No    Psychological Assessment  History of Substance Abuse: None  History of Psychiatric Problems: No  Non-compliant with Treatment: No  Newly Diagnosed Illness: No    Discharge Risks or Barriers  Discharge risks or barriers?: No    Anticipated Discharge Information  Anticipated discharge disposition: Discharge needs currently unknown  Discharge Address: [address] [place] [address] [id number], [place] [id number]  Discharge Contact Phone Number: [phone]
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
## 2017-07-20 ENCOUNTER — PATIENT OUTREACH (OUTPATIENT)
Dept: HEALTH INFORMATION MANAGEMENT | Facility: OTHER | Age: 67
End: 2017-07-20

## 2017-07-20 NOTE — DISCHARGE SUMMARY
Hospital Medicine Discharge Note     Admit Date:  7/18/2017       Discharge Date:   7/19/2017    Attending Physician:  Jasbir Alonzo M.D.        Diagnoses (includes active and resolved):     Active Problems:    Hypomagnesemia corrected  with secondary hypocalcemia improved, asymptomatic     Chronic diarrhea stable     History of Anemia.    Hospital course :    Patient is 66-year-old female, history of chronic diarrhea was referred to emergency room with findings of critically low magnesium, <0.5.  Patient had findings of hypocalcemia, asymptomatic.  She was monitored on telemetry with no arrhythmias.  Patient was given IV magnesium with magnesium improved to 2.2.  Patient findings of low calcium 6.1.  She was given calcium carbonate.  Was asymptomatic.  She notes she has had electrolyte disturbances in past due to her chronic diarrhea.  GI symptoms stable tolerating diet.  Following improvement in her electrolytes she was discharged home.  Plan for addition calcium carbonate to her calcium and vitamin D supplements, also magnesium supplements.  Follow-up BMP, magnesium in 5 days.        Consultants:        none    Imaging/ Testing:      No orders to display         Procedures:          none    Discharge Medications:             Medication List      START taking these medications       Instructions    calcium carbonate 500 MG Chew   Last time this was given:  1,000 mg on 7/19/2017 10:20 AM   Commonly known as:  TUMS    Take 1 Tab by mouth 2 Times a Day.   Dose:  500 mg       Magnesium Oxide 400 MG Caps    Take 1 Cap by mouth 2 Times a Day.   Dose:  1 Cap         CONTINUE taking these medications       Instructions    CALCIUM + D PO    Take 2 Tabs by mouth every day.   Dose:  2 Tab       Diclofenac Sodium 1 % Gel    Apply to small amount to affected area BID as needed for pain       levothyroxine 75 MCG Tabs   Last time this was given:  75 mcg on 7/19/2017  5:35 AM   Commonly known as:  SYNTHROID    Take 1 Tab by  mouth Every morning on an empty stomach.   Dose:  75 mcg       METAMUCIL PO    Take 1 Dose by mouth 4 times a day.   Dose:  1 Dose       pramoxine 1 % foam   Commonly known as:  PROCTOFOAM    Apply MI QID PRN       PRILOSEC 20 MG delayed-release capsule   Generic drug:  omeprazole    Take 20 mg by mouth every day.   Dose:  20 mg       therapeutic multivitamin-minerals Tabs    Take 1 Tab by mouth every day.   Dose:  1 Tab       zolpidem 10 MG Tabs   Commonly known as:  AMBIEN    Take 1 Tab by mouth at bedtime as needed for Sleep.   Dose:  10 mg                Diet:             DIET ORDERS     None            Activity:   As tolerated.      Code status:   Full code    Primary Care Provider:    Sol Danielle M.D.    Follow up appointment details :      Fu bmp, magnesium levels in 5 days    Sol Danielle M.D.  [address]  [address]  [facility] [id number]  [phone]    In 1 week      [facility] metabolic profile, magnesium  in 5-7 days          Future Appointments  Date Time Provider Department Center   7/25/2017 5:00 PM Sol Danielle M.D. [facility]   8/14/2017 1:30 PM Dimas Horton M.D. [facility] None           Time spent on discharge day patient visit: 40 minutes    #################################################

## 2017-07-24 ENCOUNTER — HOSPITAL ENCOUNTER (OUTPATIENT)
Facility: MEDICAL CENTER | Age: 67
End: 2017-07-24
Attending: FAMILY MEDICINE
Payer: MEDICARE

## 2017-07-24 ENCOUNTER — HOSPITAL ENCOUNTER (OUTPATIENT)
Dept: LAB | Facility: MEDICAL CENTER | Age: 67
End: 2017-07-24
Attending: FAMILY MEDICINE
Payer: MEDICARE

## 2017-07-24 DIAGNOSIS — E83.51 HYPOCALCEMIA: ICD-10-CM

## 2017-07-24 PROCEDURE — 82340 ASSAY OF CALCIUM IN URINE: CPT

## 2017-07-25 ENCOUNTER — OFFICE VISIT (OUTPATIENT)
Dept: MEDICAL GROUP | Facility: MEDICAL CENTER | Age: 67
End: 2017-07-25
Payer: MEDICARE

## 2017-07-25 VITALS
OXYGEN SATURATION: 96 % | HEIGHT: 60 IN | WEIGHT: 101 LBS | TEMPERATURE: 98.3 F | BODY MASS INDEX: 19.83 KG/M2 | DIASTOLIC BLOOD PRESSURE: 72 MMHG | HEART RATE: 93 BPM | SYSTOLIC BLOOD PRESSURE: 110 MMHG

## 2017-07-25 DIAGNOSIS — E83.51 HYPOMAGNESEMIA WITH SECONDARY HYPOCALCEMIA: ICD-10-CM

## 2017-07-25 DIAGNOSIS — K52.9 CHRONIC DIARRHEA: ICD-10-CM

## 2017-07-25 DIAGNOSIS — E83.42 HYPOMAGNESEMIA WITH SECONDARY HYPOCALCEMIA: ICD-10-CM

## 2017-07-25 DIAGNOSIS — Z86.2 HISTORY OF ANEMIA: ICD-10-CM

## 2017-07-25 PROBLEM — K05.30: Status: ACTIVE | Noted: 2017-07-25

## 2017-07-25 PROCEDURE — 99214 OFFICE O/P EST MOD 30 MIN: CPT | Performed by: FAMILY MEDICINE

## 2017-07-26 ENCOUNTER — HOSPITAL ENCOUNTER (OUTPATIENT)
Dept: LAB | Facility: MEDICAL CENTER | Age: 67
End: 2017-07-26
Attending: FAMILY MEDICINE
Payer: MEDICARE

## 2017-07-26 DIAGNOSIS — E83.42 HYPOMAGNESEMIA WITH SECONDARY HYPOCALCEMIA: ICD-10-CM

## 2017-07-26 DIAGNOSIS — E83.51 HYPOMAGNESEMIA WITH SECONDARY HYPOCALCEMIA: ICD-10-CM

## 2017-07-26 DIAGNOSIS — Z86.2 HISTORY OF ANEMIA: ICD-10-CM

## 2017-07-26 DIAGNOSIS — K52.9 CHRONIC DIARRHEA: ICD-10-CM

## 2017-07-26 LAB
ALBUMIN SERPL BCP-MCNC: 3.7 G/DL (ref 3.2–4.9)
ALBUMIN/GLOB SERPL: 1.1 G/DL
ALP SERPL-CCNC: 130 U/L (ref 30–99)
ALT SERPL-CCNC: 44 U/L (ref 2–50)
ANION GAP SERPL CALC-SCNC: 7 MMOL/L (ref 0–11.9)
AST SERPL-CCNC: 42 U/L (ref 12–45)
BASOPHILS # BLD AUTO: 0.6 % (ref 0–1.8)
BASOPHILS # BLD: 0.04 K/UL (ref 0–0.12)
BILIRUB SERPL-MCNC: 0.3 MG/DL (ref 0.1–1.5)
BUN SERPL-MCNC: 20 MG/DL (ref 8–22)
CALCIUM 24H UR-MCNC: 4.3 MG/DL
CALCIUM 24H UR-MRATE: 95 MG/D
CALCIUM SERPL-MCNC: 9.7 MG/DL (ref 8.5–10.5)
CALCIUM/CREAT 24H UR: 130 MG/G (ref 20–300)
CHLORIDE SERPL-SCNC: 105 MMOL/L (ref 96–112)
CO2 SERPL-SCNC: 24 MMOL/L (ref 20–33)
CREAT 24H UR-MCNC: 33 MG/DL
CREAT 24H UR-MRATE: 726 MG/D (ref 500–1400)
CREAT SERPL-MCNC: 0.88 MG/DL (ref 0.5–1.4)
EOSINOPHIL # BLD AUTO: 0.08 K/UL (ref 0–0.51)
EOSINOPHIL NFR BLD: 1.2 % (ref 0–6.9)
ERYTHROCYTE [DISTWIDTH] IN BLOOD BY AUTOMATED COUNT: 43.2 FL (ref 35.9–50)
GFR SERPL CREATININE-BSD FRML MDRD: >60 ML/MIN/1.73 M 2
GLOBULIN SER CALC-MCNC: 3.4 G/DL (ref 1.9–3.5)
GLUCOSE SERPL-MCNC: 96 MG/DL (ref 65–99)
HCT VFR BLD AUTO: 36.7 % (ref 37–47)
HGB BLD-MCNC: 11.6 G/DL (ref 12–16)
IMM GRANULOCYTES # BLD AUTO: 0.02 K/UL (ref 0–0.11)
IMM GRANULOCYTES NFR BLD AUTO: 0.3 % (ref 0–0.9)
IRON SATN MFR SERPL: 11 % (ref 15–55)
IRON SERPL-MCNC: 44 UG/DL (ref 40–170)
LYMPHOCYTES # BLD AUTO: 1.7 K/UL (ref 1–4.8)
LYMPHOCYTES NFR BLD: 24.5 % (ref 22–41)
MAGNESIUM SERPL-MCNC: 1.2 MG/DL (ref 1.5–2.5)
MCH RBC QN AUTO: 31 PG (ref 27–33)
MCHC RBC AUTO-ENTMCNC: 31.6 G/DL (ref 33.6–35)
MCV RBC AUTO: 98.1 FL (ref 81.4–97.8)
MONOCYTES # BLD AUTO: 1.11 K/UL (ref 0–0.85)
MONOCYTES NFR BLD AUTO: 16 % (ref 0–13.4)
NEUTROPHILS # BLD AUTO: 3.98 K/UL (ref 2–7.15)
NEUTROPHILS NFR BLD: 57.4 % (ref 44–72)
NRBC # BLD AUTO: 0 K/UL
NRBC BLD AUTO-RTO: 0 /100 WBC
PLATELET # BLD AUTO: 374 K/UL (ref 164–446)
PMV BLD AUTO: 11.6 FL (ref 9–12.9)
POTASSIUM SERPL-SCNC: 5.3 MMOL/L (ref 3.6–5.5)
PROT SERPL-MCNC: 7.1 G/DL (ref 6–8.2)
RBC # BLD AUTO: 3.74 M/UL (ref 4.2–5.4)
SODIUM SERPL-SCNC: 136 MMOL/L (ref 135–145)
SPECIMEN VOL ?TM UR: 2200 ML
TIBC SERPL-MCNC: 396 UG/DL (ref 250–450)
WBC # BLD AUTO: 6.9 K/UL (ref 4.8–10.8)

## 2017-07-26 PROCEDURE — 85025 COMPLETE CBC W/AUTO DIFF WBC: CPT

## 2017-07-26 PROCEDURE — 83550 IRON BINDING TEST: CPT

## 2017-07-26 PROCEDURE — 80053 COMPREHEN METABOLIC PANEL: CPT

## 2017-07-26 PROCEDURE — 83735 ASSAY OF MAGNESIUM: CPT

## 2017-07-26 PROCEDURE — 83540 ASSAY OF IRON: CPT

## 2017-07-26 PROCEDURE — 36415 COLL VENOUS BLD VENIPUNCTURE: CPT

## 2017-07-26 NOTE — ASSESSMENT & PLAN NOTE
Has had for 15 years.  She was seen at [facility] and had a full work-up that was negative.  She has 3 - 6 stools daily although she has been doing better since she got out of the hospital.  No black or bloody stools.  She takes peptobismal as needed.

## 2017-07-27 ENCOUNTER — HOSPITAL ENCOUNTER (OUTPATIENT)
Facility: MEDICAL CENTER | Age: 67
End: 2017-07-27
Attending: FAMILY MEDICINE
Payer: MEDICARE

## 2017-07-27 DIAGNOSIS — K52.9 CHRONIC DIARRHEA: ICD-10-CM

## 2017-07-27 LAB
C DIFF DNA SPEC QL NAA+PROBE: NEGATIVE
C DIFF TOX GENS STL QL NAA+PROBE: NEGATIVE
G LAMBLIA+C PARVUM AG STL QL RAPID: NORMAL
SIGNIFICANT IND 70042: NORMAL
SITE SITE: NORMAL
SOURCE SOURCE: NORMAL

## 2017-07-27 PROCEDURE — 87493 C DIFF AMPLIFIED PROBE: CPT

## 2017-07-27 PROCEDURE — 87329 GIARDIA AG IA: CPT

## 2017-07-27 PROCEDURE — 87328 CRYPTOSPORIDIUM AG IA: CPT

## 2017-08-01 NOTE — ASSESSMENT & PLAN NOTE
Patient was hospitalized with a critically low Magnesium.  She has been taking 2 magnesium daily.  She reports she feels fine and has been out golfing.  No weakness or muscle spasm.  She just dropped off her 24 hour urine but there were not repeat lab orders from her discharge.
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
## 2017-08-22 ENCOUNTER — PATIENT OUTREACH (OUTPATIENT)
Dept: HEALTH INFORMATION MANAGEMENT | Facility: OTHER | Age: 67
End: 2017-08-22

## 2017-08-22 NOTE — PROGRESS NOTES
Mamie Michaels was admitted on 7/18/17 to [facility] and discharged on 7/19 with a LACE score of 23 and a diagnosis of Hypomagnesemia. The patient discharged with instructions to follow up with her PCP, Sol Danielle on 7/24, 7/25 and 7/27 which were all kept. Mamie had a future appointment for Acupuncture with Dimas Horton scheduled for 8/14/2017 however, the patient canceled. [facility]’s Patient Advocate was able to engage with patient several times post-discharge, and confirmed that all medications were filled.

## 2017-08-25 ENCOUNTER — HOSPITAL ENCOUNTER (OUTPATIENT)
Dept: LAB | Facility: MEDICAL CENTER | Age: 67
End: 2017-08-25
Attending: INTERNAL MEDICINE
Payer: MEDICARE

## 2017-08-25 LAB
ALBUMIN SERPL BCP-MCNC: 3.6 G/DL (ref 3.2–4.9)
ALBUMIN/GLOB SERPL: 1.2 G/DL
ALP SERPL-CCNC: 96 U/L (ref 30–99)
ALT SERPL-CCNC: 32 U/L (ref 2–50)
ANION GAP SERPL CALC-SCNC: 7 MMOL/L (ref 0–11.9)
AST SERPL-CCNC: 34 U/L (ref 12–45)
BASOPHILS # BLD AUTO: 1 % (ref 0–1.8)
BASOPHILS # BLD: 0.07 K/UL (ref 0–0.12)
BILIRUB SERPL-MCNC: 0.3 MG/DL (ref 0.1–1.5)
BUN SERPL-MCNC: 18 MG/DL (ref 8–22)
CALCIUM SERPL-MCNC: 9.3 MG/DL (ref 8.5–10.5)
CHLORIDE SERPL-SCNC: 109 MMOL/L (ref 96–112)
CO2 SERPL-SCNC: 21 MMOL/L (ref 20–33)
CREAT SERPL-MCNC: 0.73 MG/DL (ref 0.5–1.4)
EOSINOPHIL # BLD AUTO: 0.04 K/UL (ref 0–0.51)
EOSINOPHIL NFR BLD: 0.5 % (ref 0–6.9)
ERYTHROCYTE [DISTWIDTH] IN BLOOD BY AUTOMATED COUNT: 45.1 FL (ref 35.9–50)
GFR SERPL CREATININE-BSD FRML MDRD: >60 ML/MIN/1.73 M 2
GLOBULIN SER CALC-MCNC: 3.1 G/DL (ref 1.9–3.5)
GLUCOSE SERPL-MCNC: 99 MG/DL (ref 65–99)
HCT VFR BLD AUTO: 26.7 % (ref 37–47)
HGB BLD-MCNC: 8.3 G/DL (ref 12–16)
IMM GRANULOCYTES # BLD AUTO: 0.02 K/UL (ref 0–0.11)
IMM GRANULOCYTES NFR BLD AUTO: 0.3 % (ref 0–0.9)
LYMPHOCYTES # BLD AUTO: 1.83 K/UL (ref 1–4.8)
LYMPHOCYTES NFR BLD: 24.9 % (ref 22–41)
MAGNESIUM SERPL-MCNC: 1.1 MG/DL (ref 1.5–2.5)
MCH RBC QN AUTO: 29 PG (ref 27–33)
MCHC RBC AUTO-ENTMCNC: 31.1 G/DL (ref 33.6–35)
MCV RBC AUTO: 93.4 FL (ref 81.4–97.8)
MONOCYTES # BLD AUTO: 0.8 K/UL (ref 0–0.85)
MONOCYTES NFR BLD AUTO: 10.9 % (ref 0–13.4)
NEUTROPHILS # BLD AUTO: 4.58 K/UL (ref 2–7.15)
NEUTROPHILS NFR BLD: 62.4 % (ref 44–72)
NRBC # BLD AUTO: 0 K/UL
NRBC BLD AUTO-RTO: 0 /100 WBC
PLATELET # BLD AUTO: 500 K/UL (ref 164–446)
PMV BLD AUTO: 9.7 FL (ref 9–12.9)
POTASSIUM SERPL-SCNC: 4.3 MMOL/L (ref 3.6–5.5)
PROT SERPL-MCNC: 6.7 G/DL (ref 6–8.2)
RBC # BLD AUTO: 2.86 M/UL (ref 4.2–5.4)
SODIUM SERPL-SCNC: 137 MMOL/L (ref 135–145)
WBC # BLD AUTO: 7.3 K/UL (ref 4.8–10.8)

## 2017-08-25 PROCEDURE — 80053 COMPREHEN METABOLIC PANEL: CPT

## 2017-08-25 PROCEDURE — 83735 ASSAY OF MAGNESIUM: CPT

## 2017-08-25 PROCEDURE — 36415 COLL VENOUS BLD VENIPUNCTURE: CPT

## 2017-08-25 PROCEDURE — 85025 COMPLETE CBC W/AUTO DIFF WBC: CPT
